# Patient Record
Sex: FEMALE | Race: BLACK OR AFRICAN AMERICAN | NOT HISPANIC OR LATINO | URBAN - METROPOLITAN AREA
[De-identification: names, ages, dates, MRNs, and addresses within clinical notes are randomized per-mention and may not be internally consistent; named-entity substitution may affect disease eponyms.]

---

## 2020-11-01 ENCOUNTER — EMERGENCY (EMERGENCY)
Facility: HOSPITAL | Age: 75
LOS: 1 days | Discharge: HOME | End: 2020-11-01
Attending: EMERGENCY MEDICINE | Admitting: EMERGENCY MEDICINE
Payer: MEDICARE

## 2020-11-01 VITALS
DIASTOLIC BLOOD PRESSURE: 78 MMHG | TEMPERATURE: 98 F | RESPIRATION RATE: 18 BRPM | SYSTOLIC BLOOD PRESSURE: 178 MMHG | OXYGEN SATURATION: 100 % | HEART RATE: 69 BPM

## 2020-11-01 VITALS
RESPIRATION RATE: 18 BRPM | DIASTOLIC BLOOD PRESSURE: 85 MMHG | HEART RATE: 68 BPM | SYSTOLIC BLOOD PRESSURE: 164 MMHG | OXYGEN SATURATION: 100 % | TEMPERATURE: 97 F

## 2020-11-01 PROCEDURE — 70486 CT MAXILLOFACIAL W/O DYE: CPT | Mod: 26

## 2020-11-01 PROCEDURE — 99284 EMERGENCY DEPT VISIT MOD MDM: CPT | Mod: GC

## 2020-11-01 PROCEDURE — 72170 X-RAY EXAM OF PELVIS: CPT | Mod: 26

## 2020-11-01 PROCEDURE — 70450 CT HEAD/BRAIN W/O DYE: CPT | Mod: 26

## 2020-11-01 NOTE — ED ADULT TRIAGE NOTE - CHIEF COMPLAINT QUOTE
pt with hx of alzheimers dementia, brought in by spouse, s/p mechanical fall last night attempting to get into the shower. +head injury. c/o right eye pain , swelling and redness. denies loc. pt is A+Ox0. no AC use.  hx- dementia, htn

## 2020-11-01 NOTE — ED PROVIDER NOTE - CLINICAL SUMMARY MEDICAL DECISION MAKING FREE TEXT BOX
74F s/p blow to face, no LOC, no neck pain, +eye redness and swelling, CT head, will do fluoroscein and eye exam, no preceding lightheadedness/dizziness, no c/o weakness/numbness,. no use of blood thinners.

## 2020-11-01 NOTE — ED ADULT NURSE NOTE - OBJECTIVE STATEMENT
mechanical fall last night during shower while  was helping patient. Patient with right eye injury with notable blood in eye and periorbital swelling,  denies LOC, otherwise skin intact. Patient is AOX0 at baseline r/t dementia. Patient does not verbalize needs,  at bedside provides care for patient. Patient is ambulatory with unsteady gait at baseline. As per  at bedside, patient is acting the same as baseline.

## 2020-11-01 NOTE — ED PROVIDER NOTE - OBJECTIVE STATEMENT
73 y/o F with alzheimer's dementia (pt history obtained by  present at bedside) and HTN presents s/p mechanical fall last night witnessed by . He states she tripped on the ledge of the shower and was unable to catch her . She struck the right side of her head and eye. No LOC. Pt's  states patients mental status is at baseline with no difficulty walking or sleepiness. Remainder of hx is limited.

## 2020-11-01 NOTE — ED PROVIDER NOTE - ATTENDING CONTRIBUTION TO CARE
Seen and examined, hx of dementia, baseline mental status per , accompanied by  to ED, pt. was being assisted into tub when she stepped on her own inside, slipped and fell forward, +blow to R face/eye, no LOC, no N/V, was able to stand and ambulate, no other c/o of pain, no neck c/o, no weakness/numbness, denies visual changes but unreliable historian. WOOD, EOMI, +chemosis to R lower lid, normal sclera, neck NT, full ROM, clear lungs, heart reg, abd soft, NT to palp, no edema, NT calves.

## 2020-11-01 NOTE — ED PROVIDER NOTE - NSFOLLOWUPINSTRUCTIONS_ED_ALL_ED_FT
You were seen in the emergency department for a fall. Your CT scans and x-ray were negative for bleeding or fractures. A copy of the results is attached.     Rest, drink plenty of fluids.  Advance activity as tolerated.  Continue all previously prescribed medications as directed.  Follow up with your primary care physician in 48-72 hours- bring copies of your results.  Return to the ER for worsening or persistent symptoms, and/or ANY NEW OR CONCERNING SYMPTOMS. If you have issues obtaining follow up, please call: [phone] to obtain a doctor or specialist who takes your insurance in your area.

## 2020-11-01 NOTE — ED PROVIDER NOTE - PHYSICAL EXAMINATION
Bruising inferior R eye, mild tenderness. Lateral conjunctival hemorrhage R eye, no corneal abrasion. no sidel sign

## 2020-11-01 NOTE — ED PROVIDER NOTE - PATIENT PORTAL LINK FT
You can access the FollowMyHealth Patient Portal offered by [facility] by registering at the following website: [email or web address]. By joining [employer]’s FollowMyHealth portal, you will also be able to view your health information using other applications (apps) compatible with our system.

## 2021-01-29 NOTE — ED PROVIDER NOTE - THROAT, MLM
Form from [facility] received with recommendation to switch statin therapy due to interaction between Cardizem and Zocor. Dr Juana Petit would like to switch Zocor 20 mg to Lipitor 20 mg daily. Spoke with Ms Kofi Beano and she verbalized understanding. Lipitor sent to [facility]. uvula midline, no vesicles, no redness, and no oropharyngeal exudate.

## 2023-02-16 ENCOUNTER — INPATIENT (INPATIENT)
Facility: HOSPITAL | Age: 78
LOS: 6 days | Discharge: HOME HEALTH | End: 2023-02-23
Attending: HOSPITALIST | Admitting: HOSPITALIST
Payer: MEDICARE

## 2023-02-16 VITALS
RESPIRATION RATE: 16 BRPM | DIASTOLIC BLOOD PRESSURE: 63 MMHG | TEMPERATURE: 100 F | SYSTOLIC BLOOD PRESSURE: 156 MMHG | OXYGEN SATURATION: 100 % | HEART RATE: 69 BPM

## 2023-02-16 LAB
ALBUMIN SERPL ELPH-MCNC: 4.4 G/DL — SIGNIFICANT CHANGE UP (ref 3.3–5)
ALP SERPL-CCNC: 111 U/L — SIGNIFICANT CHANGE UP (ref 40–120)
ALT FLD-CCNC: 20 U/L — SIGNIFICANT CHANGE UP (ref 4–33)
ANION GAP SERPL CALC-SCNC: 12 MMOL/L — SIGNIFICANT CHANGE UP (ref 7–14)
APTT BLD: 29.3 SEC — SIGNIFICANT CHANGE UP (ref 27–36.3)
AST SERPL-CCNC: 26 U/L — SIGNIFICANT CHANGE UP (ref 4–32)
BASE EXCESS BLDV CALC-SCNC: -1.5 MMOL/L — SIGNIFICANT CHANGE UP (ref -2–3)
BASOPHILS # BLD AUTO: 0.02 K/UL — SIGNIFICANT CHANGE UP (ref 0–0.2)
BASOPHILS NFR BLD AUTO: 0.3 % — SIGNIFICANT CHANGE UP (ref 0–2)
BILIRUB SERPL-MCNC: 0.6 MG/DL — SIGNIFICANT CHANGE UP (ref 0.2–1.2)
BLOOD GAS VENOUS COMPREHENSIVE RESULT: SIGNIFICANT CHANGE UP
BUN SERPL-MCNC: 22 MG/DL — SIGNIFICANT CHANGE UP (ref 7–23)
CALCIUM SERPL-MCNC: 9.4 MG/DL — SIGNIFICANT CHANGE UP (ref 8.4–10.5)
CHLORIDE BLDV-SCNC: 107 MMOL/L — SIGNIFICANT CHANGE UP (ref 96–108)
CHLORIDE SERPL-SCNC: 104 MMOL/L — SIGNIFICANT CHANGE UP (ref 98–107)
CO2 BLDV-SCNC: 26.2 MMOL/L — HIGH (ref 22–26)
CO2 SERPL-SCNC: 24 MMOL/L — SIGNIFICANT CHANGE UP (ref 22–31)
CREAT SERPL-MCNC: 1.58 MG/DL — HIGH (ref 0.5–1.3)
EGFR: 34 ML/MIN/1.73M2 — LOW
EOSINOPHIL # BLD AUTO: 0.01 K/UL — SIGNIFICANT CHANGE UP (ref 0–0.5)
EOSINOPHIL NFR BLD AUTO: 0.1 % — SIGNIFICANT CHANGE UP (ref 0–6)
GAS PNL BLDV: 137 MMOL/L — SIGNIFICANT CHANGE UP (ref 136–145)
GLUCOSE BLDV-MCNC: 106 MG/DL — HIGH (ref 70–99)
GLUCOSE SERPL-MCNC: 108 MG/DL — HIGH (ref 70–99)
HCO3 BLDV-SCNC: 25 MMOL/L — SIGNIFICANT CHANGE UP (ref 22–29)
HCT VFR BLD CALC: 42.1 % — SIGNIFICANT CHANGE UP (ref 34.5–45)
HCT VFR BLDA CALC: 41 % — SIGNIFICANT CHANGE UP (ref 34.5–46.5)
HGB BLD CALC-MCNC: 13.8 G/DL — SIGNIFICANT CHANGE UP (ref 11.7–16.1)
HGB BLD-MCNC: 13.6 G/DL — SIGNIFICANT CHANGE UP (ref 11.5–15.5)
IANC: 4.86 K/UL — SIGNIFICANT CHANGE UP (ref 1.8–7.4)
IMM GRANULOCYTES NFR BLD AUTO: 0.3 % — SIGNIFICANT CHANGE UP (ref 0–0.9)
INR BLD: 1.13 RATIO — SIGNIFICANT CHANGE UP (ref 0.88–1.16)
LACTATE BLDV-MCNC: 1.5 MMOL/L — SIGNIFICANT CHANGE UP (ref 0.5–2)
LIDOCAIN IGE QN: 66 U/L — HIGH (ref 7–60)
LYMPHOCYTES # BLD AUTO: 1.25 K/UL — SIGNIFICANT CHANGE UP (ref 1–3.3)
LYMPHOCYTES # BLD AUTO: 17.4 % — SIGNIFICANT CHANGE UP (ref 13–44)
MAGNESIUM SERPL-MCNC: 2.1 MG/DL — SIGNIFICANT CHANGE UP (ref 1.6–2.6)
MCHC RBC-ENTMCNC: 30 PG — SIGNIFICANT CHANGE UP (ref 27–34)
MCHC RBC-ENTMCNC: 32.3 GM/DL — SIGNIFICANT CHANGE UP (ref 32–36)
MCV RBC AUTO: 92.7 FL — SIGNIFICANT CHANGE UP (ref 80–100)
MONOCYTES # BLD AUTO: 1.04 K/UL — HIGH (ref 0–0.9)
MONOCYTES NFR BLD AUTO: 14.4 % — HIGH (ref 2–14)
NEUTROPHILS # BLD AUTO: 4.86 K/UL — SIGNIFICANT CHANGE UP (ref 1.8–7.4)
NEUTROPHILS NFR BLD AUTO: 67.5 % — SIGNIFICANT CHANGE UP (ref 43–77)
NRBC # BLD: 0 /100 WBCS — SIGNIFICANT CHANGE UP (ref 0–0)
NRBC # FLD: 0 K/UL — SIGNIFICANT CHANGE UP (ref 0–0)
NT-PROBNP SERPL-SCNC: 238 PG/ML — SIGNIFICANT CHANGE UP
PCO2 BLDV: 47 MMHG — SIGNIFICANT CHANGE UP (ref 39–52)
PH BLDV: 7.33 — SIGNIFICANT CHANGE UP (ref 7.32–7.43)
PHOSPHATE SERPL-MCNC: 2.8 MG/DL — SIGNIFICANT CHANGE UP (ref 2.5–4.5)
PLATELET # BLD AUTO: 149 K/UL — LOW (ref 150–400)
PO2 BLDV: 28 MMHG — SIGNIFICANT CHANGE UP (ref 25–45)
POTASSIUM BLDV-SCNC: 3.8 MMOL/L — SIGNIFICANT CHANGE UP (ref 3.5–5.1)
POTASSIUM SERPL-MCNC: 3.8 MMOL/L — SIGNIFICANT CHANGE UP (ref 3.5–5.3)
POTASSIUM SERPL-SCNC: 3.8 MMOL/L — SIGNIFICANT CHANGE UP (ref 3.5–5.3)
PROT SERPL-MCNC: 7.6 G/DL — SIGNIFICANT CHANGE UP (ref 6–8.3)
PROTHROM AB SERPL-ACNC: 13.1 SEC — SIGNIFICANT CHANGE UP (ref 10.5–13.4)
RBC # BLD: 4.54 M/UL — SIGNIFICANT CHANGE UP (ref 3.8–5.2)
RBC # FLD: 14 % — SIGNIFICANT CHANGE UP (ref 10.3–14.5)
SAO2 % BLDV: 50.6 % — LOW (ref 67–88)
SARS-COV-2 RNA SPEC QL NAA+PROBE: DETECTED
SODIUM SERPL-SCNC: 140 MMOL/L — SIGNIFICANT CHANGE UP (ref 135–145)
TROPONIN T, HIGH SENSITIVITY RESULT: 16 NG/L — SIGNIFICANT CHANGE UP
WBC # BLD: 7.2 K/UL — SIGNIFICANT CHANGE UP (ref 3.8–10.5)
WBC # FLD AUTO: 7.2 K/UL — SIGNIFICANT CHANGE UP (ref 3.8–10.5)

## 2023-02-16 PROCEDURE — 99285 EMERGENCY DEPT VISIT HI MDM: CPT

## 2023-02-16 PROCEDURE — 71045 X-RAY EXAM CHEST 1 VIEW: CPT | Mod: 26

## 2023-02-16 RX ORDER — ACETAMINOPHEN 500 MG
1000 TABLET ORAL ONCE
Refills: 0 | Status: COMPLETED | OUTPATIENT
Start: 2023-02-16 | End: 2023-02-16

## 2023-02-16 RX ADMIN — Medication 400 MILLIGRAM(S): at 22:59

## 2023-02-16 NOTE — ED PROVIDER NOTE - PHYSICAL EXAMINATION
General: WN/WD NAD elderly woman lying in bed in NAD  Head: Atraumatic  Eyes: EOM grossly in tact, no scleral icterus  ENT: dry mucous membranes  Neurology: A&Ox0, nonfocal, WILDER x 4  Respiratory: normal respiratory effort, CTAB b/l, +cough  CV: Extremities warm and well perfused, RRR w/S1S2, no m/r/g  Abdominal: Soft, non-distended, nontender  Extremities: No edema  Skin: No rashes

## 2023-02-16 NOTE — ED PROVIDER NOTE - CLINICAL SUMMARY MEDICAL DECISION MAKING FREE TEXT BOX
Attending MD Sam.  Agree with above.  Pt is a 78 yo fem with pmhx of alzheimer's dementia and HTN who presents to ED with complaint of escalating lethargy.  Pt has severe dementia which leaves her non-verbal at baseline.   endorses decreased alertness over the last couple of days and noted that the pt slept all day yesterday and began having shaking today c/w likely rigors.  Family has also noted that pt has had rhinorrhea.   denies difficulty caring for her at home but brought her to ED today because of concern for illness.  Planned screening for probable viral syndrome and reassessment.   states that these sxs have not reduced their ability to provide home care and if there is no emergent need for admission  endorses comfort with taking pt home. Attending MD Sam.  Agree with above.  Pt is a 78 yo fem with pmhx of alzheimer's dementia and HTN who presents to ED with complaint of escalating lethargy.  Pt has severe dementia which leaves her non-verbal at baseline.   endorses decreased alertness over the last couple of days and noted that the pt slept all day yesterday and began having shaking today c/w likely rigors.  Family has also noted that pt has had rhinorrhea.   denies difficulty caring for her at home but brought her to ED today because of concern for illness.  Planned screening for probable viral syndrome and reassessment.   states that these sxs have not reduced their ability to provide home care and if there is no emergent need for admission  endorses comfort with taking pt home.  Planned labs, CXR, RVP.  Pt hemodynamically stable at time of signout to incoming team pending admission.

## 2023-02-16 NOTE — ED ADULT TRIAGE NOTE - CHIEF COMPLAINT QUOTE
Pt brought to ED by family c/o increased lethargy since Tuesday. As per family, Pt slept all day yesterday, started shaking 8:30 AM yesterday morning. Family also states Pt has been congested. Pt nonverbal, unable to follow commands at baseline due to Alzheimers.

## 2023-02-16 NOTE — ED PROVIDER NOTE - OBJECTIVE STATEMENT
Pt is a 77yoF w/Hx of alzheimer's dementia (noncommunicative, pt history obtained by  present at bedside), HTN, HLD p/w lethargy. Since Tuesday, patient has had worsening lethargy associated with chills/rigors, rhinorrhea, dry cough.  Has been noticed patient has been sleeping throughout the day, sleeping while eating.  Limited ROS; no associated fevers, vomiting, change in urine color/odor, hematuria/hematochezia, falls, focal weakness.    PMD: Caleb Bernard Pt is a 77yoF w/Hx of alzheimer's dementia (noncommunicative, pt history obtained by  present at bedside), HTN, HLD p/w lethargy. Since Tuesday, patient has had worsening lethargy associated with chills/rigors, rhinorrhea, dry cough.  Has been noticed patient has been sleeping throughout the day, sleeping while eating.  Limited ROS; no associated fevers, vomiting, change in urine color/odor, hematuria/hematochezia, falls, focal weakness.    PMD: Caleb Darden

## 2023-02-16 NOTE — ED PROVIDER NOTE - CARE PLAN
1 Principal Discharge DX:	Lethargy   Principal Discharge DX:	COVID-19  Secondary Diagnosis:	Lethargy  Secondary Diagnosis:	SAMIA (acute kidney injury)

## 2023-02-16 NOTE — ED PROVIDER NOTE - ATTENDING CONTRIBUTION TO CARE
Attending MD Sam:  I performed a history and physical exam of the patient and discussed their management with the resident. I reviewed the resident's note and agree with the documented findings and plan of care. My medical decision making and observations are found above.

## 2023-02-16 NOTE — ED ADULT NURSE NOTE - OBJECTIVE STATEMENT
Yaakov RN: Pt received in rm #14, 77Y F, nonverbal at baseline, as per  at bedside pt ambulatory with assistance. PMHX Alzheimer's. As per  pt with generalized weakness, cough and decreased PO intake. Pt rectally 100.5F on exam, VS as charted, respirations even and unlabored, IV placed 20G Rt bicep, labs sent, covid swab sent. Pt bedding changed. Pt with  at bedside, instructed to call for assistance as needed, report to primary RN.

## 2023-02-17 DIAGNOSIS — I10 ESSENTIAL (PRIMARY) HYPERTENSION: ICD-10-CM

## 2023-02-17 DIAGNOSIS — U07.1 COVID-19: ICD-10-CM

## 2023-02-17 DIAGNOSIS — R79.89 OTHER SPECIFIED ABNORMAL FINDINGS OF BLOOD CHEMISTRY: ICD-10-CM

## 2023-02-17 DIAGNOSIS — Z29.9 ENCOUNTER FOR PROPHYLACTIC MEASURES, UNSPECIFIED: ICD-10-CM

## 2023-02-17 DIAGNOSIS — G30.9 ALZHEIMER'S DISEASE, UNSPECIFIED: ICD-10-CM

## 2023-02-17 DIAGNOSIS — E78.5 HYPERLIPIDEMIA, UNSPECIFIED: ICD-10-CM

## 2023-02-17 LAB
APPEARANCE UR: ABNORMAL
B PERT DNA SPEC QL NAA+PROBE: SIGNIFICANT CHANGE UP
B PERT+PARAPERT DNA PNL SPEC NAA+PROBE: SIGNIFICANT CHANGE UP
BACTERIA # UR AUTO: ABNORMAL
BILIRUB UR-MCNC: NEGATIVE — SIGNIFICANT CHANGE UP
BORDETELLA PARAPERTUSSIS (RAPRVP): SIGNIFICANT CHANGE UP
C PNEUM DNA SPEC QL NAA+PROBE: SIGNIFICANT CHANGE UP
COLOR SPEC: YELLOW — SIGNIFICANT CHANGE UP
DIFF PNL FLD: ABNORMAL
EPI CELLS # UR: SIGNIFICANT CHANGE UP
FLUAV SUBTYP SPEC NAA+PROBE: SIGNIFICANT CHANGE UP
FLUBV RNA SPEC QL NAA+PROBE: SIGNIFICANT CHANGE UP
GLUCOSE UR QL: NEGATIVE — SIGNIFICANT CHANGE UP
HADV DNA SPEC QL NAA+PROBE: SIGNIFICANT CHANGE UP
HCOV 229E RNA SPEC QL NAA+PROBE: SIGNIFICANT CHANGE UP
HCOV HKU1 RNA SPEC QL NAA+PROBE: SIGNIFICANT CHANGE UP
HCOV NL63 RNA SPEC QL NAA+PROBE: SIGNIFICANT CHANGE UP
HCOV OC43 RNA SPEC QL NAA+PROBE: SIGNIFICANT CHANGE UP
HMPV RNA SPEC QL NAA+PROBE: SIGNIFICANT CHANGE UP
HPIV1 RNA SPEC QL NAA+PROBE: SIGNIFICANT CHANGE UP
HPIV2 RNA SPEC QL NAA+PROBE: SIGNIFICANT CHANGE UP
HPIV3 RNA SPEC QL NAA+PROBE: SIGNIFICANT CHANGE UP
HPIV4 RNA SPEC QL NAA+PROBE: SIGNIFICANT CHANGE UP
HYALINE CASTS # UR AUTO: SIGNIFICANT CHANGE UP /LPF (ref 0–7)
KETONES UR-MCNC: ABNORMAL
LEUKOCYTE ESTERASE UR-ACNC: NEGATIVE — SIGNIFICANT CHANGE UP
M PNEUMO DNA SPEC QL NAA+PROBE: SIGNIFICANT CHANGE UP
NITRITE UR-MCNC: NEGATIVE — SIGNIFICANT CHANGE UP
PH UR: 6 — SIGNIFICANT CHANGE UP (ref 5–8)
PROT UR-MCNC: ABNORMAL
RAPID RVP RESULT: DETECTED
RBC CASTS # UR COMP ASSIST: SIGNIFICANT CHANGE UP /HPF (ref 0–4)
RSV RNA SPEC QL NAA+PROBE: SIGNIFICANT CHANGE UP
RV+EV RNA SPEC QL NAA+PROBE: SIGNIFICANT CHANGE UP
SP GR SPEC: 1.03 — SIGNIFICANT CHANGE UP (ref 1.01–1.05)
UROBILINOGEN FLD QL: ABNORMAL
WBC UR QL: SIGNIFICANT CHANGE UP /HPF (ref 0–5)

## 2023-02-17 PROCEDURE — 99223 1ST HOSP IP/OBS HIGH 75: CPT

## 2023-02-17 PROCEDURE — 70450 CT HEAD/BRAIN W/O DYE: CPT | Mod: 26,MB

## 2023-02-17 PROCEDURE — 71250 CT THORAX DX C-: CPT | Mod: 26,MB

## 2023-02-17 RX ORDER — SIMVASTATIN 20 MG/1
5 TABLET, FILM COATED ORAL AT BEDTIME
Refills: 0 | Status: DISCONTINUED | OUTPATIENT
Start: 2023-02-17 | End: 2023-02-17

## 2023-02-17 RX ORDER — REMDESIVIR 5 MG/ML
200 INJECTION INTRAVENOUS EVERY 24 HOURS
Refills: 0 | Status: COMPLETED | OUTPATIENT
Start: 2023-02-17 | End: 2023-02-18

## 2023-02-17 RX ORDER — ACETAMINOPHEN 500 MG
650 TABLET ORAL EVERY 6 HOURS
Refills: 0 | Status: DISCONTINUED | OUTPATIENT
Start: 2023-02-17 | End: 2023-02-23

## 2023-02-17 RX ORDER — SODIUM CHLORIDE 9 MG/ML
500 INJECTION INTRAMUSCULAR; INTRAVENOUS; SUBCUTANEOUS ONCE
Refills: 0 | Status: COMPLETED | OUTPATIENT
Start: 2023-02-17 | End: 2023-02-17

## 2023-02-17 RX ORDER — SIMVASTATIN 20 MG/1
5 TABLET, FILM COATED ORAL AT BEDTIME
Refills: 0 | Status: DISCONTINUED | OUTPATIENT
Start: 2023-02-17 | End: 2023-02-23

## 2023-02-17 RX ORDER — DONEPEZIL HYDROCHLORIDE 10 MG/1
10 TABLET, FILM COATED ORAL AT BEDTIME
Refills: 0 | Status: DISCONTINUED | OUTPATIENT
Start: 2023-02-17 | End: 2023-02-23

## 2023-02-17 RX ORDER — MEMANTINE HYDROCHLORIDE 10 MG/1
10 TABLET ORAL
Refills: 0 | Status: DISCONTINUED | OUTPATIENT
Start: 2023-02-17 | End: 2023-02-23

## 2023-02-17 RX ORDER — ENOXAPARIN SODIUM 100 MG/ML
40 INJECTION SUBCUTANEOUS EVERY 12 HOURS
Refills: 0 | Status: DISCONTINUED | OUTPATIENT
Start: 2023-02-17 | End: 2023-02-23

## 2023-02-17 RX ORDER — DILTIAZEM HCL 120 MG
60 CAPSULE, EXT RELEASE 24 HR ORAL
Refills: 0 | Status: DISCONTINUED | OUTPATIENT
Start: 2023-02-17 | End: 2023-02-23

## 2023-02-17 RX ORDER — REMDESIVIR 5 MG/ML
INJECTION INTRAVENOUS
Refills: 0 | Status: COMPLETED | OUTPATIENT
Start: 2023-02-17 | End: 2023-02-20

## 2023-02-17 RX ORDER — MEMANTINE HYDROCHLORIDE 10 MG/1
10 TABLET ORAL DAILY
Refills: 0 | Status: DISCONTINUED | OUTPATIENT
Start: 2023-02-17 | End: 2023-02-17

## 2023-02-17 RX ORDER — DONEPEZIL HYDROCHLORIDE 10 MG/1
10 TABLET, FILM COATED ORAL AT BEDTIME
Refills: 0 | Status: DISCONTINUED | OUTPATIENT
Start: 2023-02-17 | End: 2023-02-17

## 2023-02-17 RX ORDER — DILTIAZEM HCL 120 MG
60 CAPSULE, EXT RELEASE 24 HR ORAL DAILY
Refills: 0 | Status: DISCONTINUED | OUTPATIENT
Start: 2023-02-17 | End: 2023-02-17

## 2023-02-17 RX ADMIN — MEMANTINE HYDROCHLORIDE 10 MILLIGRAM(S): 10 TABLET ORAL at 17:37

## 2023-02-17 RX ADMIN — Medication 60 MILLIGRAM(S): at 17:37

## 2023-02-17 RX ADMIN — SODIUM CHLORIDE 500 MILLILITER(S): 9 INJECTION INTRAMUSCULAR; INTRAVENOUS; SUBCUTANEOUS at 03:06

## 2023-02-17 NOTE — PATIENT PROFILE ADULT - NSPROGENSOURCEINFO_GEN_A_NUR
Patient sleeping, lethargic, unable to obtain information at this time. Patient sleeping, lethargic, unable to obtain information at this time./family

## 2023-02-17 NOTE — H&P ADULT - PROBLEM SELECTOR PLAN 2
Resume home Namenda and Donepezil  Family states she eats regular diet with no choking Spoke with PMD Dr. Darden who was not able to obtain baseline labs at time of conversation  Pt s/p 500 cc NS bolus  Check repeat BMP in AM

## 2023-02-17 NOTE — H&P ADULT - PROBLEM SELECTOR PLAN 3
Resume home Cardizem Resume home Namenda and Donepezil  Family states she eats regular diet with no choking

## 2023-02-17 NOTE — H&P ADULT - ASSESSMENT
77F dementia (nonverbal at baseline), requires full assist with ADLs, HTN, HLD, presents with several days of lethargy and cough in setting of acute COVID infection

## 2023-02-17 NOTE — H&P ADULT - PROBLEM SELECTOR PLAN 1
Pt fully vaccinated and boosted, not hypoxic, not tachycardic  - No indication for steroids at this time  - Paxlovid cannot be crushed per pharmacy  - Given high risk features, will start Remdesivir  - Add on D-Dimer to bloodwork to determine need for prolonged DVT ppx   - If clinically worsens can trend inflammatory markers (Ferritin, LDH, CRP)

## 2023-02-17 NOTE — H&P ADULT - NSHPSOCIALHISTORY_GEN_ALL_CORE
Lives at home with ; son and grauddaughter live close by and check in frequently.  No ETOH/tobacco use.

## 2023-02-17 NOTE — PATIENT PROFILE ADULT - FALL HARM RISK - HARM RISK INTERVENTIONS
Assistance with ambulation/Assistance OOB with selected safe patient handling equipment/Communicate Risk of Fall with Harm to all staff/Discuss with provider need for PT consult/Monitor gait and stability/Reinforce activity limits and safety measures with patient and family/Tailored Fall Risk Interventions/Visual Cue: Yellow wristband and red socks/Bed in lowest position, wheels locked, appropriate side rails in place/Call bell, personal items and telephone in reach/Instruct patient to call for assistance before getting out of bed or chair/Non-slip footwear when patient is out of bed/[place] to call system/Physically safe environment - no spills, clutter or unnecessary equipment/Purposeful Proactive Rounding/Room/bathroom lighting operational, light cord in reach

## 2023-02-17 NOTE — H&P ADULT - NSHPPHYSICALEXAM_GEN_ALL_CORE
GENERAL: NAD, well-developed, nonverbal   HEAD:  Atraumatic, Normocephalic  EYES: EOMI, PERRLA, conjunctiva and sclera clear  CHEST/LUNG: Clear to auscultation bilaterally; No wheeze  HEART: Regular rate and rhythm; No murmurs, rubs, or gallops  ABDOMEN: Soft, Nontender, Nondistended; Bowel sounds present  EXTREMITIES:  2+ Peripheral Pulses, No clubbing, cyanosis, or edema  NEUROLOGY: non-focal, spontaneously moving all 4 extremities, noncommunicative   SKIN: No rashes or lesions

## 2023-02-17 NOTE — H&P ADULT - PROBLEM SELECTOR PLAN 6
DVT ppx with Lovenox 40mg BID for now (CrCl >15), monitor Cr daily and switch to HSQ of CrCl worsens   Care discussed with family at bedside

## 2023-02-17 NOTE — H&P ADULT - NSHPLABSRESULTS_GEN_ALL_CORE
LABS:                        13.6   7.20  )-----------( 149      ( 2023 22:02 )             42.1     02-16    140  |  104  |  22  ----------------------------<  108<H>  3.8   |  24  |  1.58<H>    Ca    9.4      2023 22:02  Phos  2.8     02-16  Mg     2.10     -16    TPro  7.6  /  Alb  4.4  /  TBili  0.6  /  DBili  x   /  AST  26  /  ALT  20  /  AlkPhos  111  02-16    PT/INR - ( 2023 22:02 )   PT: 13.1 sec;   INR: 1.13 ratio         PTT - ( 2023 22:02 )  PTT:29.3 sec      Urinalysis Basic - ( 2023 04:22 )    Color: Yellow / Appearance: Slightly Turbid / S.030 / pH: x  Gluc: x / Ketone: Trace  / Bili: Negative / Urobili: 3 mg/dL   Blood: x / Protein: 30 mg/dL / Nitrite: Negative   Leuk Esterase: Negative / RBC: 0-5 /HPF / WBC 0-5 /HPF   Sq Epi: x / Non Sq Epi: Occasional / Bacteria: Occasional    SARS-CoV-2: Detected (2023 22:44)    RADIOLOGY & ADDITIONAL TESTS:  New Imaging Personally Reviewed Today: Clear lungs, no consolidation or effusions  New Electrocardiogram Personally Reviewed Today:    COMMUNICATION:  Care Discussed with Consultants/Other Providers and Details of Discussion:  Discussions with Patient/Family:  PCP Communication: LABS:                        13.6   7.20  )-----------( 149      ( 2023 22:02 )             42.1     02-16    140  |  104  |  22  ----------------------------<  108<H>  3.8   |  24  |  1.58<H>    Ca    9.4      2023 22:02  Phos  2.8     02-16  Mg     2.10     02-16    TPro  7.6  /  Alb  4.4  /  TBili  0.6  /  DBili  x   /  AST  26  /  ALT  20  /  AlkPhos  111  02-16    PT/INR - ( 2023 22:02 )   PT: 13.1 sec;   INR: 1.13 ratio         PTT - ( 2023 22:02 )  PTT:29.3 sec      Urinalysis Basic - ( 2023 04:22 )    Color: Yellow / Appearance: Slightly Turbid / S.030 / pH: x  Gluc: x / Ketone: Trace  / Bili: Negative / Urobili: 3 mg/dL   Blood: x / Protein: 30 mg/dL / Nitrite: Negative   Leuk Esterase: Negative / RBC: 0-5 /HPF / WBC 0-5 /HPF   Sq Epi: x / Non Sq Epi: Occasional / Bacteria: Occasional    SARS-CoV-2: Detected (2023 22:44)    RADIOLOGY & ADDITIONAL TESTS:  New Imaging Personally Reviewed Today: Clear lungs, no consolidation or effusions    < from: CT Chest No Cont (23 @ 00:53) >    IMPRESSION:  Left lower lobe dependent groundglass opacity may be secondary to   atelectasis. Pneumonia or aspiration not excluded. Clinical correlation   necessary  5 mm hypodense nodule in the right thyroid lobe. Elective thyroid   ultrasound canbe performed for further evaluation.    Partially visualized dilated left renal pelvis that could suggest   hydronephrosis versus parapelvic cyst. Additional imaging is recommended   for further evaluation.    < end of copied text >      New Electrocardiogram Personally Reviewed Today: NSR, TWI V3-V6,    COMMUNICATION:  Discussions with Patient/Family: , son and granddaughter  PCP Communication: DR. Darden

## 2023-02-17 NOTE — ED ADULT NURSE REASSESSMENT NOTE - NS ED NURSE REASSESS COMMENT FT1
Break coverage RN: Pt is maintaining normal mental status, breathing spontaneously and nonlabored, no acute distress in appearance. Pt offers no complaints at this time. Pt medicated as per orders via patent 20G IV in L AC. Safety precautions in place and to be maintained. Will continue to monitor.
Pt at baseline mental status, awake and alert but remains nonverbal. Pt in NAD, no nonverbal indicators of pain present. RR even and unlabored. Pt stable upon exiting room, safety measures in place
Pt straight catheterized using sterile technique 14 Russian catheter, approx 400ml of bright yellow urine drained. UA/UC sent as per orders. Safety measures in place, stretcher locked and in lowest position. Pt stable upon exiting the room
Received report from night shift RN. Pt resting comfortably in bed, denies complaints. Pt A&Ox0 at baseline, pt cleaned of incontinence, turned and repositioned for comfort. Will continue to monitor.
Pt resting comfortably in bed, in no apparent distress. Pt turned and repositioned for comfort, pt pending admission bed assignment. Will continue to monitor.
Pt resting comfortably in bed, pt cleaned of incontinence, turned and repositioned for comfort. Will continue to monitor.

## 2023-02-17 NOTE — H&P ADULT - HISTORY OF PRESENT ILLNESS
77F dementia (nonverbal at baseline), HTN, HLD, presents with several days of lethargy.  77F dementia (nonverbal at baseline), requires full assist with ADLs, HTN, HLD, presents with several days of lethargy.  Collateral was obtained from , son and granddaughter at bedside.  They noticed she was very fatigued and difficult to get out of bed, which at baseline she is usually able to join the family for meals while ambulating with assistance. They note subjective fevers but did not take her temperature. She developed a productive cough since Wednesday morning.  They are unaware of known COVID exposures and pt is largely home bound.  She is fully vaccinated and boosted against COVID-19 and this is her first COVID infection.

## 2023-02-18 LAB
ALBUMIN SERPL ELPH-MCNC: 4.1 G/DL — SIGNIFICANT CHANGE UP (ref 3.3–5)
ALP SERPL-CCNC: 105 U/L — SIGNIFICANT CHANGE UP (ref 40–120)
ALT FLD-CCNC: 22 U/L — SIGNIFICANT CHANGE UP (ref 4–33)
ANION GAP SERPL CALC-SCNC: 13 MMOL/L — SIGNIFICANT CHANGE UP (ref 7–14)
AST SERPL-CCNC: 26 U/L — SIGNIFICANT CHANGE UP (ref 4–32)
BASOPHILS # BLD AUTO: 0.01 K/UL — SIGNIFICANT CHANGE UP (ref 0–0.2)
BASOPHILS NFR BLD AUTO: 0.2 % — SIGNIFICANT CHANGE UP (ref 0–2)
BILIRUB SERPL-MCNC: 0.5 MG/DL — SIGNIFICANT CHANGE UP (ref 0.2–1.2)
BUN SERPL-MCNC: 15 MG/DL — SIGNIFICANT CHANGE UP (ref 7–23)
CALCIUM SERPL-MCNC: 9.5 MG/DL — SIGNIFICANT CHANGE UP (ref 8.4–10.5)
CHLORIDE SERPL-SCNC: 105 MMOL/L — SIGNIFICANT CHANGE UP (ref 98–107)
CO2 SERPL-SCNC: 23 MMOL/L — SIGNIFICANT CHANGE UP (ref 22–31)
CREAT SERPL-MCNC: 0.93 MG/DL — SIGNIFICANT CHANGE UP (ref 0.5–1.3)
CULTURE RESULTS: SIGNIFICANT CHANGE UP
EGFR: 64 ML/MIN/1.73M2 — SIGNIFICANT CHANGE UP
EOSINOPHIL # BLD AUTO: 0.03 K/UL — SIGNIFICANT CHANGE UP (ref 0–0.5)
EOSINOPHIL NFR BLD AUTO: 0.7 % — SIGNIFICANT CHANGE UP (ref 0–6)
GLUCOSE SERPL-MCNC: 94 MG/DL — SIGNIFICANT CHANGE UP (ref 70–99)
HCT VFR BLD CALC: 41.4 % — SIGNIFICANT CHANGE UP (ref 34.5–45)
HCV AB S/CO SERPL IA: 0.06 S/CO — SIGNIFICANT CHANGE UP (ref 0–0.99)
HCV AB SERPL-IMP: SIGNIFICANT CHANGE UP
HGB BLD-MCNC: 13.6 G/DL — SIGNIFICANT CHANGE UP (ref 11.5–15.5)
IANC: 2.56 K/UL — SIGNIFICANT CHANGE UP (ref 1.8–7.4)
IMM GRANULOCYTES NFR BLD AUTO: 0.2 % — SIGNIFICANT CHANGE UP (ref 0–0.9)
LYMPHOCYTES # BLD AUTO: 1.34 K/UL — SIGNIFICANT CHANGE UP (ref 1–3.3)
LYMPHOCYTES # BLD AUTO: 29.4 % — SIGNIFICANT CHANGE UP (ref 13–44)
MAGNESIUM SERPL-MCNC: 2.1 MG/DL — SIGNIFICANT CHANGE UP (ref 1.6–2.6)
MCHC RBC-ENTMCNC: 30.2 PG — SIGNIFICANT CHANGE UP (ref 27–34)
MCHC RBC-ENTMCNC: 32.9 GM/DL — SIGNIFICANT CHANGE UP (ref 32–36)
MCV RBC AUTO: 92 FL — SIGNIFICANT CHANGE UP (ref 80–100)
MONOCYTES # BLD AUTO: 0.61 K/UL — SIGNIFICANT CHANGE UP (ref 0–0.9)
MONOCYTES NFR BLD AUTO: 13.4 % — SIGNIFICANT CHANGE UP (ref 2–14)
NEUTROPHILS # BLD AUTO: 2.56 K/UL — SIGNIFICANT CHANGE UP (ref 1.8–7.4)
NEUTROPHILS NFR BLD AUTO: 56.1 % — SIGNIFICANT CHANGE UP (ref 43–77)
NRBC # BLD: 0 /100 WBCS — SIGNIFICANT CHANGE UP (ref 0–0)
NRBC # FLD: 0 K/UL — SIGNIFICANT CHANGE UP (ref 0–0)
PHOSPHATE SERPL-MCNC: 3.2 MG/DL — SIGNIFICANT CHANGE UP (ref 2.5–4.5)
PLATELET # BLD AUTO: 157 K/UL — SIGNIFICANT CHANGE UP (ref 150–400)
POTASSIUM SERPL-MCNC: 3.6 MMOL/L — SIGNIFICANT CHANGE UP (ref 3.5–5.3)
POTASSIUM SERPL-SCNC: 3.6 MMOL/L — SIGNIFICANT CHANGE UP (ref 3.5–5.3)
PROT SERPL-MCNC: 7.3 G/DL — SIGNIFICANT CHANGE UP (ref 6–8.3)
RBC # BLD: 4.5 M/UL — SIGNIFICANT CHANGE UP (ref 3.8–5.2)
RBC # FLD: 13.4 % — SIGNIFICANT CHANGE UP (ref 10.3–14.5)
SODIUM SERPL-SCNC: 141 MMOL/L — SIGNIFICANT CHANGE UP (ref 135–145)
SPECIMEN SOURCE: SIGNIFICANT CHANGE UP
WBC # BLD: 4.56 K/UL — SIGNIFICANT CHANGE UP (ref 3.8–10.5)
WBC # FLD AUTO: 4.56 K/UL — SIGNIFICANT CHANGE UP (ref 3.8–10.5)

## 2023-02-18 PROCEDURE — 99232 SBSQ HOSP IP/OBS MODERATE 35: CPT

## 2023-02-18 RX ORDER — REMDESIVIR 5 MG/ML
100 INJECTION INTRAVENOUS EVERY 24 HOURS
Refills: 0 | Status: COMPLETED | OUTPATIENT
Start: 2023-02-19 | End: 2023-02-20

## 2023-02-18 RX ADMIN — REMDESIVIR 200 MILLIGRAM(S): 5 INJECTION INTRAVENOUS at 06:10

## 2023-02-18 RX ADMIN — SIMVASTATIN 5 MILLIGRAM(S): 20 TABLET, FILM COATED ORAL at 01:02

## 2023-02-18 RX ADMIN — MEMANTINE HYDROCHLORIDE 10 MILLIGRAM(S): 10 TABLET ORAL at 06:10

## 2023-02-18 RX ADMIN — ENOXAPARIN SODIUM 40 MILLIGRAM(S): 100 INJECTION SUBCUTANEOUS at 06:10

## 2023-02-18 RX ADMIN — DONEPEZIL HYDROCHLORIDE 10 MILLIGRAM(S): 10 TABLET, FILM COATED ORAL at 01:02

## 2023-02-18 RX ADMIN — Medication 60 MILLIGRAM(S): at 01:02

## 2023-02-18 RX ADMIN — ENOXAPARIN SODIUM 40 MILLIGRAM(S): 100 INJECTION SUBCUTANEOUS at 18:21

## 2023-02-18 RX ADMIN — DONEPEZIL HYDROCHLORIDE 10 MILLIGRAM(S): 10 TABLET, FILM COATED ORAL at 22:22

## 2023-02-18 RX ADMIN — Medication 60 MILLIGRAM(S): at 18:21

## 2023-02-18 RX ADMIN — SIMVASTATIN 5 MILLIGRAM(S): 20 TABLET, FILM COATED ORAL at 22:22

## 2023-02-18 RX ADMIN — Medication 60 MILLIGRAM(S): at 11:51

## 2023-02-18 RX ADMIN — MEMANTINE HYDROCHLORIDE 10 MILLIGRAM(S): 10 TABLET ORAL at 18:21

## 2023-02-18 RX ADMIN — Medication 60 MILLIGRAM(S): at 06:10

## 2023-02-18 NOTE — PROGRESS NOTE ADULT - PROBLEM SELECTOR PLAN 2
Spoke with PMD Dr. Darden who was not able to obtain baseline labs at time of conversation  Improved s/p 500 cc NS bolus

## 2023-02-19 LAB
ALBUMIN SERPL ELPH-MCNC: 3.7 G/DL — SIGNIFICANT CHANGE UP (ref 3.3–5)
ALP SERPL-CCNC: 104 U/L — SIGNIFICANT CHANGE UP (ref 40–120)
ALT FLD-CCNC: 24 U/L — SIGNIFICANT CHANGE UP (ref 4–33)
ANION GAP SERPL CALC-SCNC: 11 MMOL/L — SIGNIFICANT CHANGE UP (ref 7–14)
AST SERPL-CCNC: 28 U/L — SIGNIFICANT CHANGE UP (ref 4–32)
BASOPHILS # BLD AUTO: 0.02 K/UL — SIGNIFICANT CHANGE UP (ref 0–0.2)
BASOPHILS NFR BLD AUTO: 0.6 % — SIGNIFICANT CHANGE UP (ref 0–2)
BILIRUB SERPL-MCNC: 0.5 MG/DL — SIGNIFICANT CHANGE UP (ref 0.2–1.2)
BUN SERPL-MCNC: 17 MG/DL — SIGNIFICANT CHANGE UP (ref 7–23)
CALCIUM SERPL-MCNC: 9.5 MG/DL — SIGNIFICANT CHANGE UP (ref 8.4–10.5)
CHLORIDE SERPL-SCNC: 108 MMOL/L — HIGH (ref 98–107)
CO2 SERPL-SCNC: 25 MMOL/L — SIGNIFICANT CHANGE UP (ref 22–31)
CREAT SERPL-MCNC: 1.07 MG/DL — SIGNIFICANT CHANGE UP (ref 0.5–1.3)
D DIMER BLD IA.RAPID-MCNC: 182 NG/ML DDU — SIGNIFICANT CHANGE UP
EGFR: 54 ML/MIN/1.73M2 — LOW
EOSINOPHIL # BLD AUTO: 0.03 K/UL — SIGNIFICANT CHANGE UP (ref 0–0.5)
EOSINOPHIL NFR BLD AUTO: 0.9 % — SIGNIFICANT CHANGE UP (ref 0–6)
GLUCOSE SERPL-MCNC: 90 MG/DL — SIGNIFICANT CHANGE UP (ref 70–99)
HCT VFR BLD CALC: 43.7 % — SIGNIFICANT CHANGE UP (ref 34.5–45)
HGB BLD-MCNC: 14.1 G/DL — SIGNIFICANT CHANGE UP (ref 11.5–15.5)
IANC: 1.48 K/UL — LOW (ref 1.8–7.4)
IMM GRANULOCYTES NFR BLD AUTO: 0.3 % — SIGNIFICANT CHANGE UP (ref 0–0.9)
LYMPHOCYTES # BLD AUTO: 1.37 K/UL — SIGNIFICANT CHANGE UP (ref 1–3.3)
LYMPHOCYTES # BLD AUTO: 39.9 % — SIGNIFICANT CHANGE UP (ref 13–44)
MAGNESIUM SERPL-MCNC: 2.1 MG/DL — SIGNIFICANT CHANGE UP (ref 1.6–2.6)
MCHC RBC-ENTMCNC: 30.1 PG — SIGNIFICANT CHANGE UP (ref 27–34)
MCHC RBC-ENTMCNC: 32.3 GM/DL — SIGNIFICANT CHANGE UP (ref 32–36)
MCV RBC AUTO: 93.4 FL — SIGNIFICANT CHANGE UP (ref 80–100)
MONOCYTES # BLD AUTO: 0.52 K/UL — SIGNIFICANT CHANGE UP (ref 0–0.9)
MONOCYTES NFR BLD AUTO: 15.2 % — HIGH (ref 2–14)
NEUTROPHILS # BLD AUTO: 1.48 K/UL — LOW (ref 1.8–7.4)
NEUTROPHILS NFR BLD AUTO: 43.1 % — SIGNIFICANT CHANGE UP (ref 43–77)
NRBC # BLD: 0 /100 WBCS — SIGNIFICANT CHANGE UP (ref 0–0)
NRBC # FLD: 0 K/UL — SIGNIFICANT CHANGE UP (ref 0–0)
PHOSPHATE SERPL-MCNC: 3.6 MG/DL — SIGNIFICANT CHANGE UP (ref 2.5–4.5)
PLATELET # BLD AUTO: 171 K/UL — SIGNIFICANT CHANGE UP (ref 150–400)
POTASSIUM SERPL-MCNC: 3.9 MMOL/L — SIGNIFICANT CHANGE UP (ref 3.5–5.3)
POTASSIUM SERPL-SCNC: 3.9 MMOL/L — SIGNIFICANT CHANGE UP (ref 3.5–5.3)
PROT SERPL-MCNC: 6.8 G/DL — SIGNIFICANT CHANGE UP (ref 6–8.3)
RBC # BLD: 4.68 M/UL — SIGNIFICANT CHANGE UP (ref 3.8–5.2)
RBC # FLD: 13.8 % — SIGNIFICANT CHANGE UP (ref 10.3–14.5)
SODIUM SERPL-SCNC: 144 MMOL/L — SIGNIFICANT CHANGE UP (ref 135–145)
WBC # BLD: 3.43 K/UL — LOW (ref 3.8–10.5)
WBC # FLD AUTO: 3.43 K/UL — LOW (ref 3.8–10.5)

## 2023-02-19 PROCEDURE — 99232 SBSQ HOSP IP/OBS MODERATE 35: CPT

## 2023-02-19 RX ADMIN — ENOXAPARIN SODIUM 40 MILLIGRAM(S): 100 INJECTION SUBCUTANEOUS at 06:00

## 2023-02-19 RX ADMIN — Medication 60 MILLIGRAM(S): at 16:29

## 2023-02-19 RX ADMIN — SIMVASTATIN 5 MILLIGRAM(S): 20 TABLET, FILM COATED ORAL at 22:14

## 2023-02-19 RX ADMIN — DONEPEZIL HYDROCHLORIDE 10 MILLIGRAM(S): 10 TABLET, FILM COATED ORAL at 22:14

## 2023-02-19 RX ADMIN — ENOXAPARIN SODIUM 40 MILLIGRAM(S): 100 INJECTION SUBCUTANEOUS at 17:32

## 2023-02-19 RX ADMIN — MEMANTINE HYDROCHLORIDE 10 MILLIGRAM(S): 10 TABLET ORAL at 06:00

## 2023-02-19 RX ADMIN — Medication 60 MILLIGRAM(S): at 23:36

## 2023-02-19 RX ADMIN — Medication 60 MILLIGRAM(S): at 00:24

## 2023-02-19 RX ADMIN — MEMANTINE HYDROCHLORIDE 10 MILLIGRAM(S): 10 TABLET ORAL at 17:32

## 2023-02-19 RX ADMIN — REMDESIVIR 200 MILLIGRAM(S): 5 INJECTION INTRAVENOUS at 06:29

## 2023-02-19 RX ADMIN — Medication 60 MILLIGRAM(S): at 06:00

## 2023-02-20 LAB
BASOPHILS # BLD AUTO: 0.01 K/UL — SIGNIFICANT CHANGE UP (ref 0–0.2)
BASOPHILS NFR BLD AUTO: 0.2 % — SIGNIFICANT CHANGE UP (ref 0–2)
EOSINOPHIL # BLD AUTO: 0.07 K/UL — SIGNIFICANT CHANGE UP (ref 0–0.5)
EOSINOPHIL NFR BLD AUTO: 1.3 % — SIGNIFICANT CHANGE UP (ref 0–6)
HCT VFR BLD CALC: 41.8 % — SIGNIFICANT CHANGE UP (ref 34.5–45)
HGB BLD-MCNC: 13.6 G/DL — SIGNIFICANT CHANGE UP (ref 11.5–15.5)
IANC: 2.81 K/UL — SIGNIFICANT CHANGE UP (ref 1.8–7.4)
IMM GRANULOCYTES NFR BLD AUTO: 0.4 % — SIGNIFICANT CHANGE UP (ref 0–0.9)
LYMPHOCYTES # BLD AUTO: 1.72 K/UL — SIGNIFICANT CHANGE UP (ref 1–3.3)
LYMPHOCYTES # BLD AUTO: 33 % — SIGNIFICANT CHANGE UP (ref 13–44)
MCHC RBC-ENTMCNC: 30.1 PG — SIGNIFICANT CHANGE UP (ref 27–34)
MCHC RBC-ENTMCNC: 32.5 GM/DL — SIGNIFICANT CHANGE UP (ref 32–36)
MCV RBC AUTO: 92.5 FL — SIGNIFICANT CHANGE UP (ref 80–100)
MONOCYTES # BLD AUTO: 0.59 K/UL — SIGNIFICANT CHANGE UP (ref 0–0.9)
MONOCYTES NFR BLD AUTO: 11.3 % — SIGNIFICANT CHANGE UP (ref 2–14)
NEUTROPHILS # BLD AUTO: 2.81 K/UL — SIGNIFICANT CHANGE UP (ref 1.8–7.4)
NEUTROPHILS NFR BLD AUTO: 53.8 % — SIGNIFICANT CHANGE UP (ref 43–77)
NRBC # BLD: 0 /100 WBCS — SIGNIFICANT CHANGE UP (ref 0–0)
NRBC # FLD: 0 K/UL — SIGNIFICANT CHANGE UP (ref 0–0)
PLATELET # BLD AUTO: 177 K/UL — SIGNIFICANT CHANGE UP (ref 150–400)
RBC # BLD: 4.52 M/UL — SIGNIFICANT CHANGE UP (ref 3.8–5.2)
RBC # FLD: 13.5 % — SIGNIFICANT CHANGE UP (ref 10.3–14.5)
WBC # BLD: 5.22 K/UL — SIGNIFICANT CHANGE UP (ref 3.8–10.5)
WBC # FLD AUTO: 5.22 K/UL — SIGNIFICANT CHANGE UP (ref 3.8–10.5)

## 2023-02-20 PROCEDURE — 99232 SBSQ HOSP IP/OBS MODERATE 35: CPT

## 2023-02-20 RX ADMIN — SIMVASTATIN 5 MILLIGRAM(S): 20 TABLET, FILM COATED ORAL at 22:18

## 2023-02-20 RX ADMIN — Medication 60 MILLIGRAM(S): at 18:06

## 2023-02-20 RX ADMIN — Medication 60 MILLIGRAM(S): at 05:57

## 2023-02-20 RX ADMIN — ENOXAPARIN SODIUM 40 MILLIGRAM(S): 100 INJECTION SUBCUTANEOUS at 18:06

## 2023-02-20 RX ADMIN — MEMANTINE HYDROCHLORIDE 10 MILLIGRAM(S): 10 TABLET ORAL at 05:57

## 2023-02-20 RX ADMIN — Medication 60 MILLIGRAM(S): at 11:38

## 2023-02-20 RX ADMIN — MEMANTINE HYDROCHLORIDE 10 MILLIGRAM(S): 10 TABLET ORAL at 18:06

## 2023-02-20 RX ADMIN — ENOXAPARIN SODIUM 40 MILLIGRAM(S): 100 INJECTION SUBCUTANEOUS at 05:57

## 2023-02-20 RX ADMIN — DONEPEZIL HYDROCHLORIDE 10 MILLIGRAM(S): 10 TABLET, FILM COATED ORAL at 22:18

## 2023-02-20 RX ADMIN — REMDESIVIR 200 MILLIGRAM(S): 5 INJECTION INTRAVENOUS at 05:57

## 2023-02-20 NOTE — PROGRESS NOTE ADULT - PROBLEM SELECTOR PLAN 2
Prior team spoke with PMD Dr. Darden who was not able to obtain baseline labs at time of conversation  Improved s/p 500 cc NS bolus Prior team spoke with PMD Dr. Darden who was not able to obtain baseline labs at time of conversation  Improved s/p 500 cc NS bolus  CT chest suggestive of hydro vs cysts?  Obtain renal US to further evaluate

## 2023-02-20 NOTE — PROGRESS NOTE ADULT - PROBLEM SELECTOR PLAN 3
Resume home Namenda and Donepezil  Family states she eats regular diet with no choking Resume home Namenda and Donepezil  Family states she eats regular diet with no choking  CT chest with possible signs of aspiration  Speech and swallow eval requested

## 2023-02-21 LAB
ALBUMIN SERPL ELPH-MCNC: 3.5 G/DL — SIGNIFICANT CHANGE UP (ref 3.3–5)
ALP SERPL-CCNC: 97 U/L — SIGNIFICANT CHANGE UP (ref 40–120)
ALT FLD-CCNC: 42 U/L — HIGH (ref 4–33)
ANION GAP SERPL CALC-SCNC: 11 MMOL/L — SIGNIFICANT CHANGE UP (ref 7–14)
AST SERPL-CCNC: 34 U/L — HIGH (ref 4–32)
BASOPHILS # BLD AUTO: 0.02 K/UL — SIGNIFICANT CHANGE UP (ref 0–0.2)
BASOPHILS NFR BLD AUTO: 0.5 % — SIGNIFICANT CHANGE UP (ref 0–2)
BILIRUB SERPL-MCNC: 0.5 MG/DL — SIGNIFICANT CHANGE UP (ref 0.2–1.2)
BUN SERPL-MCNC: 17 MG/DL — SIGNIFICANT CHANGE UP (ref 7–23)
CALCIUM SERPL-MCNC: 9.2 MG/DL — SIGNIFICANT CHANGE UP (ref 8.4–10.5)
CHLORIDE SERPL-SCNC: 106 MMOL/L — SIGNIFICANT CHANGE UP (ref 98–107)
CO2 SERPL-SCNC: 23 MMOL/L — SIGNIFICANT CHANGE UP (ref 22–31)
CREAT SERPL-MCNC: 1 MG/DL — SIGNIFICANT CHANGE UP (ref 0.5–1.3)
EGFR: 59 ML/MIN/1.73M2 — LOW
EOSINOPHIL # BLD AUTO: 0.05 K/UL — SIGNIFICANT CHANGE UP (ref 0–0.5)
EOSINOPHIL NFR BLD AUTO: 1.2 % — SIGNIFICANT CHANGE UP (ref 0–6)
GLUCOSE SERPL-MCNC: 107 MG/DL — HIGH (ref 70–99)
HCT VFR BLD CALC: 39.5 % — SIGNIFICANT CHANGE UP (ref 34.5–45)
HGB BLD-MCNC: 13.2 G/DL — SIGNIFICANT CHANGE UP (ref 11.5–15.5)
IANC: 2.38 K/UL — SIGNIFICANT CHANGE UP (ref 1.8–7.4)
IMM GRANULOCYTES NFR BLD AUTO: 0.7 % — SIGNIFICANT CHANGE UP (ref 0–0.9)
LYMPHOCYTES # BLD AUTO: 1.3 K/UL — SIGNIFICANT CHANGE UP (ref 1–3.3)
LYMPHOCYTES # BLD AUTO: 30.7 % — SIGNIFICANT CHANGE UP (ref 13–44)
MAGNESIUM SERPL-MCNC: 2 MG/DL — SIGNIFICANT CHANGE UP (ref 1.6–2.6)
MCHC RBC-ENTMCNC: 31.1 PG — SIGNIFICANT CHANGE UP (ref 27–34)
MCHC RBC-ENTMCNC: 33.4 GM/DL — SIGNIFICANT CHANGE UP (ref 32–36)
MCV RBC AUTO: 93.2 FL — SIGNIFICANT CHANGE UP (ref 80–100)
MONOCYTES # BLD AUTO: 0.46 K/UL — SIGNIFICANT CHANGE UP (ref 0–0.9)
MONOCYTES NFR BLD AUTO: 10.8 % — SIGNIFICANT CHANGE UP (ref 2–14)
NEUTROPHILS # BLD AUTO: 2.38 K/UL — SIGNIFICANT CHANGE UP (ref 1.8–7.4)
NEUTROPHILS NFR BLD AUTO: 56.1 % — SIGNIFICANT CHANGE UP (ref 43–77)
NRBC # BLD: 0 /100 WBCS — SIGNIFICANT CHANGE UP (ref 0–0)
NRBC # FLD: 0 K/UL — SIGNIFICANT CHANGE UP (ref 0–0)
PHOSPHATE SERPL-MCNC: 3.3 MG/DL — SIGNIFICANT CHANGE UP (ref 2.5–4.5)
PLATELET # BLD AUTO: 171 K/UL — SIGNIFICANT CHANGE UP (ref 150–400)
POTASSIUM SERPL-MCNC: 3.6 MMOL/L — SIGNIFICANT CHANGE UP (ref 3.5–5.3)
POTASSIUM SERPL-SCNC: 3.6 MMOL/L — SIGNIFICANT CHANGE UP (ref 3.5–5.3)
PROT SERPL-MCNC: 6.4 G/DL — SIGNIFICANT CHANGE UP (ref 6–8.3)
RBC # BLD: 4.24 M/UL — SIGNIFICANT CHANGE UP (ref 3.8–5.2)
RBC # FLD: 13.6 % — SIGNIFICANT CHANGE UP (ref 10.3–14.5)
SODIUM SERPL-SCNC: 140 MMOL/L — SIGNIFICANT CHANGE UP (ref 135–145)
T3 SERPL-MCNC: 108 NG/DL — SIGNIFICANT CHANGE UP (ref 80–200)
TSH SERPL-MCNC: 1.7 UIU/ML — SIGNIFICANT CHANGE UP (ref 0.27–4.2)
WBC # BLD: 4.24 K/UL — SIGNIFICANT CHANGE UP (ref 3.8–10.5)
WBC # FLD AUTO: 4.24 K/UL — SIGNIFICANT CHANGE UP (ref 3.8–10.5)

## 2023-02-21 PROCEDURE — 99232 SBSQ HOSP IP/OBS MODERATE 35: CPT

## 2023-02-21 PROCEDURE — 76770 US EXAM ABDO BACK WALL COMP: CPT | Mod: 26

## 2023-02-21 RX ADMIN — Medication 60 MILLIGRAM(S): at 18:32

## 2023-02-21 RX ADMIN — Medication 60 MILLIGRAM(S): at 00:18

## 2023-02-21 RX ADMIN — ENOXAPARIN SODIUM 40 MILLIGRAM(S): 100 INJECTION SUBCUTANEOUS at 18:33

## 2023-02-21 RX ADMIN — ENOXAPARIN SODIUM 40 MILLIGRAM(S): 100 INJECTION SUBCUTANEOUS at 05:18

## 2023-02-21 RX ADMIN — SIMVASTATIN 5 MILLIGRAM(S): 20 TABLET, FILM COATED ORAL at 22:04

## 2023-02-21 RX ADMIN — Medication 60 MILLIGRAM(S): at 13:37

## 2023-02-21 RX ADMIN — MEMANTINE HYDROCHLORIDE 10 MILLIGRAM(S): 10 TABLET ORAL at 05:18

## 2023-02-21 RX ADMIN — Medication 60 MILLIGRAM(S): at 23:17

## 2023-02-21 RX ADMIN — MEMANTINE HYDROCHLORIDE 10 MILLIGRAM(S): 10 TABLET ORAL at 18:32

## 2023-02-21 RX ADMIN — Medication 60 MILLIGRAM(S): at 05:18

## 2023-02-21 RX ADMIN — DONEPEZIL HYDROCHLORIDE 10 MILLIGRAM(S): 10 TABLET, FILM COATED ORAL at 22:06

## 2023-02-21 NOTE — PROGRESS NOTE ADULT - PROBLEM SELECTOR PLAN 3
Resume home Namenda and Donepezil  Family states she eats regular diet with no choking  CT chest with possible signs of aspiration less likely given COVID infection  Speech and swallow eval requested

## 2023-02-21 NOTE — PHYSICAL THERAPY INITIAL EVALUATION ADULT - DIAGNOSIS, PT EVAL
deconditioning, decreased strength, decreased balance, postural instability limiting functional mobility

## 2023-02-21 NOTE — PHYSICAL THERAPY INITIAL EVALUATION ADULT - NSPTDISCHREC_GEN_A_CORE
Pt to be placed on skilled physical therapy trial for 1-2 more sessions to assess pt. ability to actively participate in skilled PT services. Please follow therapy services for ongoing assessment of functional mobility.

## 2023-02-21 NOTE — PROGRESS NOTE ADULT - PROBLEM SELECTOR PLAN 2
Prior team spoke with PMD Dr. Darden who was not able to obtain baseline labs at time of conversation  Improved s/p 500 cc NS bolus  CT chest suggestive of hydro vs cysts?  USX showed cyst no hydronephrosis

## 2023-02-21 NOTE — PHYSICAL THERAPY INITIAL EVALUATION ADULT - ADDITIONAL COMMENTS
pt is non verbal at baseline, unable to follow commands throughout PT session. Limited history obtained from medical documents. as per documents pt is fully dependent with ADLs and ambulates with assistance (unaware if pt requiring ambulatory device) Please consult with case management/social work for further clarification of history.     Pt. left comfortable in bed with all tubes/lines intact, call bell in reach and in NAD.

## 2023-02-22 LAB
ALBUMIN SERPL ELPH-MCNC: 3.4 G/DL — SIGNIFICANT CHANGE UP (ref 3.3–5)
ALP SERPL-CCNC: 110 U/L — SIGNIFICANT CHANGE UP (ref 40–120)
ALT FLD-CCNC: 46 U/L — HIGH (ref 4–33)
ANION GAP SERPL CALC-SCNC: 9 MMOL/L — SIGNIFICANT CHANGE UP (ref 7–14)
AST SERPL-CCNC: 37 U/L — HIGH (ref 4–32)
BASOPHILS # BLD AUTO: 0.01 K/UL — SIGNIFICANT CHANGE UP (ref 0–0.2)
BASOPHILS NFR BLD AUTO: 0.2 % — SIGNIFICANT CHANGE UP (ref 0–2)
BILIRUB SERPL-MCNC: 0.4 MG/DL — SIGNIFICANT CHANGE UP (ref 0.2–1.2)
BUN SERPL-MCNC: 12 MG/DL — SIGNIFICANT CHANGE UP (ref 7–23)
CALCIUM SERPL-MCNC: 9.4 MG/DL — SIGNIFICANT CHANGE UP (ref 8.4–10.5)
CHLORIDE SERPL-SCNC: 108 MMOL/L — HIGH (ref 98–107)
CO2 SERPL-SCNC: 24 MMOL/L — SIGNIFICANT CHANGE UP (ref 22–31)
CREAT SERPL-MCNC: 0.97 MG/DL — SIGNIFICANT CHANGE UP (ref 0.5–1.3)
CULTURE RESULTS: SIGNIFICANT CHANGE UP
CULTURE RESULTS: SIGNIFICANT CHANGE UP
EGFR: 61 ML/MIN/1.73M2 — SIGNIFICANT CHANGE UP
EOSINOPHIL # BLD AUTO: 0.08 K/UL — SIGNIFICANT CHANGE UP (ref 0–0.5)
EOSINOPHIL NFR BLD AUTO: 1.5 % — SIGNIFICANT CHANGE UP (ref 0–6)
GLUCOSE SERPL-MCNC: 105 MG/DL — HIGH (ref 70–99)
HCT VFR BLD CALC: 43.4 % — SIGNIFICANT CHANGE UP (ref 34.5–45)
HGB BLD-MCNC: 14 G/DL — SIGNIFICANT CHANGE UP (ref 11.5–15.5)
IANC: 3.18 K/UL — SIGNIFICANT CHANGE UP (ref 1.8–7.4)
IMM GRANULOCYTES NFR BLD AUTO: 0.6 % — SIGNIFICANT CHANGE UP (ref 0–0.9)
LYMPHOCYTES # BLD AUTO: 1.44 K/UL — SIGNIFICANT CHANGE UP (ref 1–3.3)
LYMPHOCYTES # BLD AUTO: 27.8 % — SIGNIFICANT CHANGE UP (ref 13–44)
MAGNESIUM SERPL-MCNC: 2.1 MG/DL — SIGNIFICANT CHANGE UP (ref 1.6–2.6)
MCHC RBC-ENTMCNC: 30.4 PG — SIGNIFICANT CHANGE UP (ref 27–34)
MCHC RBC-ENTMCNC: 32.3 GM/DL — SIGNIFICANT CHANGE UP (ref 32–36)
MCV RBC AUTO: 94.1 FL — SIGNIFICANT CHANGE UP (ref 80–100)
MONOCYTES # BLD AUTO: 0.44 K/UL — SIGNIFICANT CHANGE UP (ref 0–0.9)
MONOCYTES NFR BLD AUTO: 8.5 % — SIGNIFICANT CHANGE UP (ref 2–14)
NEUTROPHILS # BLD AUTO: 3.18 K/UL — SIGNIFICANT CHANGE UP (ref 1.8–7.4)
NEUTROPHILS NFR BLD AUTO: 61.4 % — SIGNIFICANT CHANGE UP (ref 43–77)
NRBC # BLD: 0 /100 WBCS — SIGNIFICANT CHANGE UP (ref 0–0)
NRBC # FLD: 0 K/UL — SIGNIFICANT CHANGE UP (ref 0–0)
PHOSPHATE SERPL-MCNC: 3.4 MG/DL — SIGNIFICANT CHANGE UP (ref 2.5–4.5)
PLATELET # BLD AUTO: 198 K/UL — SIGNIFICANT CHANGE UP (ref 150–400)
POTASSIUM SERPL-MCNC: 3.9 MMOL/L — SIGNIFICANT CHANGE UP (ref 3.5–5.3)
POTASSIUM SERPL-SCNC: 3.9 MMOL/L — SIGNIFICANT CHANGE UP (ref 3.5–5.3)
PROT SERPL-MCNC: 6.7 G/DL — SIGNIFICANT CHANGE UP (ref 6–8.3)
RBC # BLD: 4.61 M/UL — SIGNIFICANT CHANGE UP (ref 3.8–5.2)
RBC # FLD: 13.3 % — SIGNIFICANT CHANGE UP (ref 10.3–14.5)
SODIUM SERPL-SCNC: 141 MMOL/L — SIGNIFICANT CHANGE UP (ref 135–145)
SPECIMEN SOURCE: SIGNIFICANT CHANGE UP
SPECIMEN SOURCE: SIGNIFICANT CHANGE UP
WBC # BLD: 5.18 K/UL — SIGNIFICANT CHANGE UP (ref 3.8–10.5)
WBC # FLD AUTO: 5.18 K/UL — SIGNIFICANT CHANGE UP (ref 3.8–10.5)

## 2023-02-22 PROCEDURE — 99232 SBSQ HOSP IP/OBS MODERATE 35: CPT

## 2023-02-22 RX ADMIN — Medication 60 MILLIGRAM(S): at 12:35

## 2023-02-22 RX ADMIN — MEMANTINE HYDROCHLORIDE 10 MILLIGRAM(S): 10 TABLET ORAL at 05:09

## 2023-02-22 RX ADMIN — Medication 60 MILLIGRAM(S): at 17:44

## 2023-02-22 RX ADMIN — ENOXAPARIN SODIUM 40 MILLIGRAM(S): 100 INJECTION SUBCUTANEOUS at 05:09

## 2023-02-22 RX ADMIN — Medication 60 MILLIGRAM(S): at 05:09

## 2023-02-22 RX ADMIN — MEMANTINE HYDROCHLORIDE 10 MILLIGRAM(S): 10 TABLET ORAL at 17:42

## 2023-02-22 RX ADMIN — ENOXAPARIN SODIUM 40 MILLIGRAM(S): 100 INJECTION SUBCUTANEOUS at 17:42

## 2023-02-22 NOTE — SWALLOW BEDSIDE ASSESSMENT ADULT - SWALLOW EVAL: DIAGNOSIS
1- Mild oral stage for puree, soft and bite sized solids, regular solids, and thin liquids characterized by adequate oral containment, adequate mastication, adequate bolus manipulation with adequate anterior to posterior transfer with oral clearance noted. 2- Moderate oral stage for regular solids characterized by adequate oral containment, slowed/prolonged mastication with adequate anterior to posterior transport with trace oral residue noted and cleared with liquid wash. 3- Functional pharyngeal stage for puree, soft and bite sized solids, regular solids, and thin liquids characterized by initiation of pharyngeal swallow and hyolaryngeal excursion upon palpation. No overt signs of aspiration noted across PO trials. 1- Functional oral stage for puree, soft and bite sized solids, regular solids, and thin liquids characterized by adequate oral containment, adequate mastication, adequate bolus manipulation with adequate anterior to posterior transfer with oral clearance noted. 2- Moderate oral stage for regular solids characterized by adequate oral containment, slowed/prolonged mastication with adequate anterior to posterior transport with trace oral residue noted and cleared with liquid wash. 3- Functional pharyngeal stage for puree, soft and bite sized solids, regular solids, and thin liquids characterized by initiation of pharyngeal swallow and hyolaryngeal excursion upon palpation. No overt signs of aspiration noted across PO trials. 1- Functional oral stage for puree, soft and bite sized solids, and thin liquids characterized by adequate oral containment, adequate mastication, adequate bolus manipulation with adequate anterior to posterior transfer with oral clearance noted. 2- Moderate oral stage for regular solids characterized by adequate oral containment, slowed/prolonged mastication with adequate anterior to posterior transport with trace oral residue noted and cleared with liquid wash. 3- Functional pharyngeal stage for puree, soft and bite sized solids, regular solids, and thin liquids characterized by initiation of pharyngeal swallow and hyolaryngeal excursion upon palpation. No overt signs of aspiration noted across PO trials.

## 2023-02-22 NOTE — PROGRESS NOTE ADULT - PROBLEM SELECTOR PLAN 3
c/w home Namenda and Donepezil  Family states she eats regular diet with no choking  CT chest with possible signs of aspiration less likely given COVID infection, bedside S/S with no gross aspiration, will not pursue MBS at this time

## 2023-02-22 NOTE — PROGRESS NOTE ADULT - PROBLEM SELECTOR PLAN 4
Resume home Cardizem

## 2023-02-22 NOTE — PROGRESS NOTE ADULT - PROBLEM SELECTOR PROBLEM 2
Elevated serum creatinine

## 2023-02-22 NOTE — PROGRESS NOTE ADULT - PROBLEM SELECTOR PROBLEM 3
Routing refill request to provider for review/approval because:  Medication is reported/historical    Antonieta Patel RN on 10/11/2021 at 12:52 PM          
Alzheimer's dementia

## 2023-02-22 NOTE — SWALLOW BEDSIDE ASSESSMENT ADULT - COMMENTS
Progress Note- Hospitalist 2/21: "77F dementia (nonverbal at baseline), requires full assist with ADLs, HTN, HLD, presents with several days of lethargy and cough in setting of acute COVID infection."    CR Chest 2/17: "IMPRESSION: Left lower lobe dependent groundglass opacity may be secondary to atelectasis. Pneumonia or aspiration not excluded. Clinical correlation necessary. 5 mm hypodense nodule in the right thyroid lobe. Elective thyroid ultrasound can be performed for further evaluation."    Patient seen awake/alert and pleasantly confused during clinical swallow evaluation this AM. Patient is did not follow simple directives or respond to SLP prompts.

## 2023-02-22 NOTE — SWALLOW BEDSIDE ASSESSMENT ADULT - ASR SWALLOW RECOMMEND DIAG
Consider cinesophragram if there is concern for aspiration pneumonia given CXR 2/17 at MD's discretion/VFSS/MBS

## 2023-02-22 NOTE — PROGRESS NOTE ADULT - PROBLEM SELECTOR PLAN 1
Pt fully vaccinated and boosted, not hypoxic, not tachycardic  - No indication for steroids at this time  - Paxlovid cannot be crushed per pharmacy  - Given high risk features, will start Remdesivir  - D-Dimer wnl  - If clinically worsens can trend inflammatory markers (Ferritin, LDH, CRP)
Pt fully vaccinated and boosted, not hypoxic, not tachycardic  - No indication for steroids at this time  - Paxlovid cannot be crushed per pharmacy  - Given high risk features, will start Remdesivir  - Add on D-Dimer to AM bloodwork to determine need for prolonged DVT ppx   - If clinically worsens can trend inflammatory markers (Ferritin, LDH, CRP)
Pt fully vaccinated and boosted, not hypoxic, not tachycardic  - No indication for steroids at this time  - Paxlovid cannot be crushed per pharmacy  - Given high risk features, will start Remdesivir - s/p 3 day course   - D-Dimer wnl  - If clinically worsens can trend inflammatory markers (Ferritin, LDH, CRP)
Pt fully vaccinated and boosted, not hypoxic, not tachycardic  - No indication for steroids at this time  - Paxlovid cannot be crushed per pharmacy  -  Remdesivir - s/p 3 day course   - D-Dimer wnl  - If clinically worsens can trend inflammatory markers (Ferritin, LDH, CRP)
Pt fully vaccinated and boosted, not hypoxic, not tachycardic  - No indication for steroids at this time  - Paxlovid cannot be crushed per pharmacy  -  Remdesivir - s/p 3 day course   - D-Dimer wnl  - If clinically worsens can trend inflammatory markers (Ferritin, LDH, CRP)

## 2023-02-22 NOTE — PROGRESS NOTE ADULT - SUBJECTIVE AND OBJECTIVE BOX
[facility] Medicine  Torrey Mcleod MD  Pager [phone]      Patient is a 77y old  Female who presents with a chief complaint of COVID and lethargy (21 Feb 2023 18:22)      SUBJECTIVE / OVERNIGHT EVENTS: Unable to obtain due to non-verbal status    MEDICATIONS  (STANDING):  diltiazem    Tablet 60 milliGRAM(s) Oral four times a day  donepezil 10 milliGRAM(s) Oral at bedtime  enoxaparin Injectable 40 milliGRAM(s) SubCutaneous every 12 hours  memantine 10 milliGRAM(s) Oral two times a day  simvastatin 5 milliGRAM(s) Oral at bedtime    MEDICATIONS  (PRN):  acetaminophen     Tablet .. 650 milliGRAM(s) Oral every 6 hours PRN Temp greater or equal to 38C (100.4F), Mild Pain (1 - 3)      CAPILLARY BLOOD GLUCOSE        I&O's Summary    21 Feb 2023 07:01  -  22 Feb 2023 07:00  --------------------------------------------------------  IN: 360 mL / OUT: 425 mL / NET: -65 mL    22 Feb 2023 07:01  -  22 Feb 2023 14:39  --------------------------------------------------------  IN: 240 mL / OUT: 400 mL / NET: -160 mL        PHYSICAL EXAM:  Vital Signs Last 24 Hrs  T(C): 37.1 (22 Feb 2023 12:32), Max: 37.1 (21 Feb 2023 18:29)  T(F): 98.8 (22 Feb 2023 12:32), Max: 98.8 (21 Feb 2023 18:29)  HR: 69 (22 Feb 2023 12:32) (62 - 69)  BP: 146/91 (22 Feb 2023 12:32) (111/77 - 152/74)  BP(mean): 98 (21 Feb 2023 23:15) (98 - 98)  RR: 18 (22 Feb 2023 12:32) (18 - 18)  SpO2: 100% (22 Feb 2023 12:32) (97% - 100%)    Parameters below as of 22 Feb 2023 12:32  Patient On (Oxygen Delivery Method): room air      CONSTITUTIONAL: NAD  EYES: conjunctiva and sclera clear  ENMT: mmm  NECK: Supple,  RESPIRATORY: Normal respiratory effort; lungs are clear to auscultation bilaterally  CARDIOVASCULAR: Regular rate and rhythm, + S1 and S2  ABDOMEN: Nontender to palpation, normoactive bowel sounds, no rebound/guarding  MUSCULOSKELETAL:  no clubbing or cyanosis of digits;   PSYCH: Alert, not oriented  NEUROLOGY: no gross deficits   SKIN: No rashes;     LABS:                        14.0   5.18  )-----------( 198      ( 22 Feb 2023 05:58 )             43.4     02-22    141  |  108<H>  |  12  ----------------------------<  105<H>  3.9   |  24  |  0.97    Ca    9.4      22 Feb 2023 05:58  Phos  3.4     02-22  Mg     2.10     02-22    TPro  6.7  /  Alb  3.4  /  TBili  0.4  /  DBili  x   /  AST  37<H>  /  ALT  46<H>  /  AlkPhos  110  02-22            
Patient is a 77y old  Female who presents with a chief complaint of COVID and lethargy (2023 10:26)      SUBJECTIVE / OVERNIGHT EVENTS: No acute events overnight. Per RN, no issues this AM.     MEDICATIONS  (STANDING):  diltiazem    Tablet 60 milliGRAM(s) Oral four times a day  donepezil 10 milliGRAM(s) Oral at bedtime  enoxaparin Injectable 40 milliGRAM(s) SubCutaneous every 12 hours  memantine 10 milliGRAM(s) Oral two times a day  remdesivir  IVPB   IV Intermittent   simvastatin 5 milliGRAM(s) Oral at bedtime    MEDICATIONS  (PRN):  acetaminophen     Tablet .. 650 milliGRAM(s) Oral every 6 hours PRN Temp greater or equal to 38C (100.4F), Mild Pain (1 - 3)      T(C): 36.9 (23 @ 11:50), Max: 37.3 (23 @ 23:35)  HR: 61 (23 @ 11:50) (61 - 84)  BP: 162/84 (23 @ 11:50) (154/80 - 171/89)  RR: 18 (23 @ 11:50) (15 - 18)  SpO2: 100% (23 @ 11:50) (99% - 100%)  CAPILLARY BLOOD GLUCOSE        I&O's Summary      PHYSICAL EXAM:  GENERAL: no apparent distress, on room air  EYES: EOMI, PERRLA, conjunctiva and sclera clear b/l  CHEST/LUNG: Clear to auscultation bilaterally; No wheezing or crackles  HEART: s1/s2, RRR, no murmurs appreciated  ABDOMEN: Soft, Nontender, Nondistended; Bowel sounds present  EXTREMITIES:  2+ Peripheral Pulses, No clubbing, cyanosis, or edema  MSK: no joint effusions  Back: no spinal tenderness  NEUROLOGY: awake, alert, moving all extremities   PSYCH: calm, cooperative  SKIN: No rashes or lesions    LABS:                        13.6   4.56  )-----------( 157      ( 2023 08:06 )             41.4     02-18    141  |  105  |  15  ----------------------------<  94  3.6   |  23  |  0.93    Ca    9.5      2023 08:06  Phos  3.2     02-18  Mg     2.10     -18    TPro  7.3  /  Alb  4.1  /  TBili  0.5  /  DBili  x   /  AST  26  /  ALT  22  /  AlkPhos  105  02-18    PT/INR - ( 2023 22:02 )   PT: 13.1 sec;   INR: 1.13 ratio         PTT - ( 2023 22:02 )  PTT:29.3 sec      Urinalysis Basic - ( 2023 04:22 )    Color: Yellow / Appearance: Slightly Turbid / S.030 / pH: x  Gluc: x / Ketone: Trace  / Bili: Negative / Urobili: 3 mg/dL   Blood: x / Protein: 30 mg/dL / Nitrite: Negative   Leuk Esterase: Negative / RBC: 0-5 /HPF / WBC 0-5 /HPF   Sq Epi: x / Non Sq Epi: Occasional / Bacteria: Occasional        RADIOLOGY & ADDITIONAL TESTS:
[facility]  Torrey Mcleod MD  Pager [phone]      Patient is a 77y old  Female who presents with a chief complaint of COVID and lethargy (20 Feb 2023 12:28)      SUBJECTIVE / OVERNIGHT EVENTS: Unable to obtain due to non-verbal status    MEDICATIONS  (STANDING):  diltiazem    Tablet 60 milliGRAM(s) Oral four times a day  donepezil 10 milliGRAM(s) Oral at bedtime  enoxaparin Injectable 40 milliGRAM(s) SubCutaneous every 12 hours  memantine 10 milliGRAM(s) Oral two times a day  simvastatin 5 milliGRAM(s) Oral at bedtime    MEDICATIONS  (PRN):  acetaminophen     Tablet .. 650 milliGRAM(s) Oral every 6 hours PRN Temp greater or equal to 38C (100.4F), Mild Pain (1 - 3)      CAPILLARY BLOOD GLUCOSE        I&O's Summary    20 Feb 2023 07:01  -  21 Feb 2023 07:00  --------------------------------------------------------  IN: 0 mL / OUT: 350 mL / NET: -350 mL    21 Feb 2023 07:01  -  21 Feb 2023 18:22  --------------------------------------------------------  IN: 120 mL / OUT: 225 mL / NET: -105 mL        PHYSICAL EXAM:  Vital Signs Last 24 Hrs  T(C): 37.4 (21 Feb 2023 13:35), Max: 37.4 (21 Feb 2023 13:35)  T(F): 99.4 (21 Feb 2023 13:35), Max: 99.4 (21 Feb 2023 13:35)  HR: 60 (21 Feb 2023 13:35) (60 - 85)  BP: 158/66 (21 Feb 2023 13:35) (158/66 - 168/79)  BP(mean): --  RR: 16 (21 Feb 2023 13:35) (16 - 18)  SpO2: 100% (21 Feb 2023 13:35) (98% - 100%)    Parameters below as of 21 Feb 2023 13:35  Patient On (Oxygen Delivery Method): room air      CONSTITUTIONAL: NAD  EYES: conjunctiva and sclera clear  ENMT: mmm  NECK: Supple,  RESPIRATORY: Normal respiratory effort; lungs are clear to auscultation bilaterally  CARDIOVASCULAR: Regular rate and rhythm, + S1 and S2  ABDOMEN: Nontender to palpation, normoactive bowel sounds, no rebound/guarding  MUSCULOSKELETAL:  no clubbing or cyanosis of digits;   PSYCH:  Alert not oriented  SKIN: No rashes;     LABS:                        13.2   4.24  )-----------( 171      ( 21 Feb 2023 07:05 )             39.5     02-21    140  |  106  |  17  ----------------------------<  107<H>  3.6   |  23  |  1.00    Ca    9.2      21 Feb 2023 07:05  Phos  3.3     02-21  Mg     2.00     02-21    TPro  6.4  /  Alb  3.5  /  TBili  0.5  /  DBili  x   /  AST  34<H>  /  ALT  42<H>  /  AlkPhos  97  02-21                    
[facility]  Dr. Chelly Hare  Pager [phone]    Patient is a 77y old  Female who presents with a chief complaint of COVID and lethargy (18 Feb 2023 16:25)    SUBJECTIVE / OVERNIGHT EVENTS: No acute events overnight. Unable to obtain any ROS from patient. Awake but not very interactive     MEDICATIONS  (STANDING):  diltiazem    Tablet 60 milliGRAM(s) Oral four times a day  donepezil 10 milliGRAM(s) Oral at bedtime  enoxaparin Injectable 40 milliGRAM(s) SubCutaneous every 12 hours  memantine 10 milliGRAM(s) Oral two times a day  remdesivir  IVPB 100 milliGRAM(s) IV Intermittent every 24 hours  remdesivir  IVPB   IV Intermittent   simvastatin 5 milliGRAM(s) Oral at bedtime    MEDICATIONS  (PRN):  acetaminophen     Tablet .. 650 milliGRAM(s) Oral every 6 hours PRN Temp greater or equal to 38C (100.4F), Mild Pain (1 - 3)    Vital Signs Last 24 Hrs  T(C): 36.9 (20 Feb 2023 11:34), Max: 37.3 (19 Feb 2023 22:10)  T(F): 98.5 (20 Feb 2023 11:34), Max: 99.1 (19 Feb 2023 22:10)  HR: 63 (20 Feb 2023 11:34) (63 - 68)  BP: 165/79 (20 Feb 2023 11:34) (151/63 - 169/88)  BP(mean): --  RR: 18 (20 Feb 2023 11:34) (17 - 18)  SpO2: 99% (20 Feb 2023 11:34) (95% - 100%)    Parameters below as of 20 Feb 2023 11:34  Patient On (Oxygen Delivery Method): room air    PHYSICAL EXAM:  GENERAL: no apparent distress, on room air  EYES: EOMI, PERRLA, conjunctiva and sclera clear b/l  CHEST/LUNG: Clear to auscultation bilaterally; No wheezing or crackles  HEART: s1/s2, RRR, no murmurs appreciated  ABDOMEN: Soft, Nontender, Nondistended; Bowel sounds present  EXTREMITIES:  2+ Peripheral Pulses, No clubbing, cyanosis, or edema  MSK: no joint effusions  Back: no spinal tenderness  NEUROLOGY: awake, alert, nonverbal  PSYCH: calm, cooperative  SKIN: No rashes or lesions    LABS:                                   13.6   5.22  )-----------( 177      ( 20 Feb 2023 06:50 )             41.8   02-19    144  |  108<H>  |  17  ----------------------------<  90  3.9   |  25  |  1.07    Ca    9.5      19 Feb 2023 07:00  Phos  3.6     02-19  Mg     2.10     02-19    TPro  6.8  /  Alb  3.7  /  TBili  0.5  /  DBili  x   /  AST  28  /  ALT  24  /  AlkPhos  104  02-19      RADIOLOGY & ADDITIONAL TESTS:
Patient is a 77y old  Female who presents with a chief complaint of COVID and lethargy (18 Feb 2023 16:25)      SUBJECTIVE / OVERNIGHT EVENTS: No acute events overnight. Per RN, no issues.     MEDICATIONS  (STANDING):  diltiazem    Tablet 60 milliGRAM(s) Oral four times a day  donepezil 10 milliGRAM(s) Oral at bedtime  enoxaparin Injectable 40 milliGRAM(s) SubCutaneous every 12 hours  memantine 10 milliGRAM(s) Oral two times a day  remdesivir  IVPB 100 milliGRAM(s) IV Intermittent every 24 hours  remdesivir  IVPB   IV Intermittent   simvastatin 5 milliGRAM(s) Oral at bedtime    MEDICATIONS  (PRN):  acetaminophen     Tablet .. 650 milliGRAM(s) Oral every 6 hours PRN Temp greater or equal to 38C (100.4F), Mild Pain (1 - 3)      T(C): 36.8 (02-19-23 @ 12:19), Max: 37.2 (02-18-23 @ 18:15)  HR: 57 (02-19-23 @ 12:19) (57 - 72)  BP: 159/81 (02-19-23 @ 12:19) (151/98 - 159/82)  RR: 18 (02-19-23 @ 12:19) (18 - 18)  SpO2: 100% (02-19-23 @ 12:19) (94% - 100%)  CAPILLARY BLOOD GLUCOSE        I&O's Summary      PHYSICAL EXAM:  GENERAL: no apparent distress, on room air  EYES: EOMI, PERRLA, conjunctiva and sclera clear b/l  CHEST/LUNG: Clear to auscultation bilaterally; No wheezing or crackles  HEART: s1/s2, RRR, no murmurs appreciated  ABDOMEN: Soft, Nontender, Nondistended; Bowel sounds present  EXTREMITIES:  2+ Peripheral Pulses, No clubbing, cyanosis, or edema  MSK: no joint effusions  Back: no spinal tenderness  NEUROLOGY: awake, alert, nonverbal  PSYCH: calm, cooperative  SKIN: No rashes or lesions    LABS:                        14.1   3.43  )-----------( 171      ( 19 Feb 2023 07:00 )             43.7     02-19    144  |  108<H>  |  17  ----------------------------<  90  3.9   |  25  |  1.07    Ca    9.5      19 Feb 2023 07:00  Phos  3.6     02-19  Mg     2.10     02-19    TPro  6.8  /  Alb  3.7  /  TBili  0.5  /  DBili  x   /  AST  28  /  ALT  24  /  AlkPhos  104  02-19              RADIOLOGY & ADDITIONAL TESTS:

## 2023-02-22 NOTE — PROGRESS NOTE ADULT - PROBLEM SELECTOR PLAN 6
DVT ppx with Lovenox 40mg BID for now (CrCl >15), monitor Cr daily and switch to HSQ of CrCl worsens   Care discussed with family at bedside
DVT ppx with Lovenox 40mg BID for now (CrCl >15), monitor Cr daily and switch to HSQ of CrCl worsens     DC planning pending final PT recs   Attempted to leave message for spouse, unable to leave a message 2/21
DVT ppx with Lovenox 40mg BID for now (CrCl >15), monitor Cr daily and switch to HSQ of CrCl worsens   Care discussed with family at bedside
DVT ppx with Lovenox 40mg BID for now (CrCl >15), monitor Cr daily and switch to HSQ of CrCl worsens     DC ready, pt is at baseline, not appropriate for PT program   Attempted to leave message for spouse, unable to leave a message 2/21 and 2/22  Dc pending family acceptance
DVT ppx with Lovenox 40mg BID for now (CrCl >15), monitor Cr daily and switch to HSQ of CrCl worsens     DC planning. PT consult ordered

## 2023-02-23 VITALS
SYSTOLIC BLOOD PRESSURE: 149 MMHG | TEMPERATURE: 99 F | HEART RATE: 66 BPM | RESPIRATION RATE: 18 BRPM | DIASTOLIC BLOOD PRESSURE: 72 MMHG | OXYGEN SATURATION: 99 %

## 2023-02-23 LAB
ALBUMIN SERPL ELPH-MCNC: 3.7 G/DL — SIGNIFICANT CHANGE UP (ref 3.3–5)
ALP SERPL-CCNC: 114 U/L — SIGNIFICANT CHANGE UP (ref 40–120)
ALT FLD-CCNC: 64 U/L — HIGH (ref 4–33)
ANION GAP SERPL CALC-SCNC: 10 MMOL/L — SIGNIFICANT CHANGE UP (ref 7–14)
AST SERPL-CCNC: 56 U/L — HIGH (ref 4–32)
BILIRUB SERPL-MCNC: 0.4 MG/DL — SIGNIFICANT CHANGE UP (ref 0.2–1.2)
BUN SERPL-MCNC: 16 MG/DL — SIGNIFICANT CHANGE UP (ref 7–23)
CALCIUM SERPL-MCNC: 9.4 MG/DL — SIGNIFICANT CHANGE UP (ref 8.4–10.5)
CHLORIDE SERPL-SCNC: 110 MMOL/L — HIGH (ref 98–107)
CO2 SERPL-SCNC: 23 MMOL/L — SIGNIFICANT CHANGE UP (ref 22–31)
CREAT SERPL-MCNC: 1.05 MG/DL — SIGNIFICANT CHANGE UP (ref 0.5–1.3)
EGFR: 55 ML/MIN/1.73M2 — LOW
GLUCOSE SERPL-MCNC: 107 MG/DL — HIGH (ref 70–99)
HCT VFR BLD CALC: 41.4 % — SIGNIFICANT CHANGE UP (ref 34.5–45)
HGB BLD-MCNC: 13.7 G/DL — SIGNIFICANT CHANGE UP (ref 11.5–15.5)
MAGNESIUM SERPL-MCNC: 2.1 MG/DL — SIGNIFICANT CHANGE UP (ref 1.6–2.6)
MCHC RBC-ENTMCNC: 30 PG — SIGNIFICANT CHANGE UP (ref 27–34)
MCHC RBC-ENTMCNC: 33.1 GM/DL — SIGNIFICANT CHANGE UP (ref 32–36)
MCV RBC AUTO: 90.8 FL — SIGNIFICANT CHANGE UP (ref 80–100)
NRBC # BLD: 0 /100 WBCS — SIGNIFICANT CHANGE UP (ref 0–0)
NRBC # FLD: 0 K/UL — SIGNIFICANT CHANGE UP (ref 0–0)
PHOSPHATE SERPL-MCNC: 3.3 MG/DL — SIGNIFICANT CHANGE UP (ref 2.5–4.5)
PLATELET # BLD AUTO: 232 K/UL — SIGNIFICANT CHANGE UP (ref 150–400)
POTASSIUM SERPL-MCNC: 4.2 MMOL/L — SIGNIFICANT CHANGE UP (ref 3.5–5.3)
POTASSIUM SERPL-SCNC: 4.2 MMOL/L — SIGNIFICANT CHANGE UP (ref 3.5–5.3)
PROT SERPL-MCNC: 6.8 G/DL — SIGNIFICANT CHANGE UP (ref 6–8.3)
RBC # BLD: 4.56 M/UL — SIGNIFICANT CHANGE UP (ref 3.8–5.2)
RBC # FLD: 13.4 % — SIGNIFICANT CHANGE UP (ref 10.3–14.5)
SODIUM SERPL-SCNC: 143 MMOL/L — SIGNIFICANT CHANGE UP (ref 135–145)
WBC # BLD: 6.72 K/UL — SIGNIFICANT CHANGE UP (ref 3.8–10.5)
WBC # FLD AUTO: 6.72 K/UL — SIGNIFICANT CHANGE UP (ref 3.8–10.5)

## 2023-02-23 PROCEDURE — 99239 HOSP IP/OBS DSCHRG MGMT >30: CPT

## 2023-02-23 RX ORDER — SIMVASTATIN 20 MG/1
1 TABLET, FILM COATED ORAL
Qty: 0 | Refills: 0 | DISCHARGE

## 2023-02-23 RX ORDER — MEMANTINE HYDROCHLORIDE 10 MG/1
1 TABLET ORAL
Qty: 0 | Refills: 0 | DISCHARGE

## 2023-02-23 RX ORDER — DONEPEZIL HYDROCHLORIDE 10 MG/1
1 TABLET, FILM COATED ORAL
Qty: 0 | Refills: 0 | DISCHARGE

## 2023-02-23 RX ORDER — DILTIAZEM HCL 120 MG
1 CAPSULE, EXT RELEASE 24 HR ORAL
Qty: 0 | Refills: 0 | DISCHARGE

## 2023-02-23 RX ADMIN — ENOXAPARIN SODIUM 40 MILLIGRAM(S): 100 INJECTION SUBCUTANEOUS at 05:04

## 2023-02-23 RX ADMIN — MEMANTINE HYDROCHLORIDE 10 MILLIGRAM(S): 10 TABLET ORAL at 05:03

## 2023-02-23 RX ADMIN — Medication 60 MILLIGRAM(S): at 00:14

## 2023-02-23 RX ADMIN — Medication 60 MILLIGRAM(S): at 17:16

## 2023-02-23 RX ADMIN — SIMVASTATIN 5 MILLIGRAM(S): 20 TABLET, FILM COATED ORAL at 00:18

## 2023-02-23 RX ADMIN — ENOXAPARIN SODIUM 40 MILLIGRAM(S): 100 INJECTION SUBCUTANEOUS at 17:16

## 2023-02-23 RX ADMIN — MEMANTINE HYDROCHLORIDE 10 MILLIGRAM(S): 10 TABLET ORAL at 17:16

## 2023-02-23 RX ADMIN — DONEPEZIL HYDROCHLORIDE 10 MILLIGRAM(S): 10 TABLET, FILM COATED ORAL at 00:18

## 2023-02-23 RX ADMIN — Medication 60 MILLIGRAM(S): at 12:17

## 2023-02-23 RX ADMIN — Medication 60 MILLIGRAM(S): at 05:03

## 2023-02-23 NOTE — DISCHARGE NOTE PROVIDER - NSDCMRMEDTOKEN_GEN_ALL_CORE_FT
Cardizem 60 mg oral tablet: 1 tab(s) orally 4 times a day  donepezil 10 mg oral tablet: 1 tab(s) orally once a day (at bedtime)  memantine 10 mg oral tablet: 1 tab(s) orally 2 times a day  Zocor 5 mg oral tablet: 1 tab(s) orally once a day (at bedtime)

## 2023-02-23 NOTE — PHARMACOTHERAPY INTERVENTION NOTE - COMMENTS
Medication list in Outpatient Medication Review (OMR) verified with pharmacy.   Family notes patient uses eyedrops, only eyedrop on file per [facility] is Combigan (1 drop affected eye(s) BID) however it was last dispensed in Oct/2022 x 75 day supply.

## 2023-02-23 NOTE — DISCHARGE NOTE NURSING/CASE MANAGEMENT/SOCIAL WORK - PATIENT PORTAL LINK FT
You can access the FollowMyHealth Patient Portal offered by [facility] by registering at the following website: [email or web address]. By joining [employer]’s FollowMyHealth portal, you will also be able to view your health information using other applications (apps) compatible with our system.

## 2023-02-23 NOTE — DISCHARGE NOTE PROVIDER - HOSPITAL COURSE
77F dementia (nonverbal at baseline), requires full assist with ADLs, HTN, HLD, presents with several days of lethargy and cough in setting of acute COVID infection     2019 novel coronavirus disease (COVID-19).   Pt fully vaccinated and boosted, not hypoxic, not tachycardic  - No indication for steroids at this time  - Paxlovid cannot be crushed per pharmacy  -  Remdesivir - s/p 3 day course   - D-Dimer wnl  - If clinically worsens can trend inflammatory markers (Ferritin, LDH, CRP).    Elevated serum creatinine.   Prior team spoke with PMD Dr. Darden who was not able to obtain baseline labs at time of conversation  Improved s/p 500 cc NS bolus  CT chest suggestive of hydro vs cysts?  USX showed cyst no hydronephrosis.    Alzheimer's dementia.   c/w home Namenda and Donepezil  Family states she eats regular diet with no choking  CT chest with possible signs of aspiration less likely given COVID infection, bedside S/S with no gross aspiration, will not pursue MBS at this time.    HTN (hypertension).   Resume home Cardizem.    HLD (hyperlipidemia).   Resume home Zocor.    Patient is medically stable for discharge on 2/23/2023 per attending Dr. Mcleod.       77F dementia (nonverbal at baseline), requires full assist with ADLs, HTN, HLD, presents with several days of lethargy and cough in setting of acute COVID infection.    Pt admitted for symptomatic COVID     2019 novel coronavirus disease (COVID-19).   -  Remdesivir - s/p 3 day course   - D-Dimer wnl       SAMIA  Prior team spoke with PMD Dr. Darden who was not able to obtain baseline labs at time of conversation  Improved s/p 500 cc NS bolus  CT chest suggestive of hydro vs cysts?  USX showed cyst no hydronephrosis.    Alzheimer's dementia.   c/w home Namenda and Donepezil  Family states she eats regular diet with no choking  CT chest with possible signs of aspiration less likely given COVID infection, bedside S/S with no gross aspiration, will not pursue MBS at this time.    HTN (hypertension).   Resume home Cardizem.    HLD (hyperlipidemia).   Resume home Zocor.    Patient is medically stable for discharge on 2/23/2023 per attending Dr. Mcleod.

## 2023-02-23 NOTE — DISCHARGE NOTE NURSING/CASE MANAGEMENT/SOCIAL WORK - NSDCPEFALRISK_GEN_ALL_CORE
For information on Fall & Injury Prevention, visit: [email or web address] OR  [email or web address] OR  https://www.cdc.gov/steadi/patient.html

## 2023-02-23 NOTE — DISCHARGE NOTE PROVIDER - NSDCCPCAREPLAN_GEN_ALL_CORE_FT
PRINCIPAL DISCHARGE DIAGNOSIS  Diagnosis: COVID-19  Assessment and Plan of Treatment: You were admitted in the hospital for COVID-19. You received remdesivir during your hospitalization which is a treatment for COVID-19. Your oxygen saturation is stable on room air. Follow up with your primary care physician outpatient.      SECONDARY DISCHARGE DIAGNOSES  Diagnosis: Alzheimer's dementia  Assessment and Plan of Treatment: Continue taking Namenda and Donepezil.    Diagnosis: HTN (hypertension)  Assessment and Plan of Treatment: Low sodium and fat diet, continue anti-hypertensive medications, and follow up with primary care physician.    Diagnosis: HLD (hyperlipidemia)  Assessment and Plan of Treatment: Low fat diet, exercise daily and continue current medications. Follow up with primary care physician and cardiologist for management.    Diagnosis: SAMIA (acute kidney injury)  Assessment and Plan of Treatment: Your kidney function was abnormal on admission. You received IV fluids during your hospitalization. Your kidney function was improved upon discharge. Follow up with your primary care physician outpatient.

## 2023-02-23 NOTE — CHART NOTE - NSCHARTNOTEFT_GEN_A_CORE
Nutrition Note:    Pt scheduled to be d/c today.  Since d/c is imminent, complete nutrition assessment at this time is not warranted.  Will remain available if plans changed.     Mandy Burt RDN #[id number]

## 2024-05-16 NOTE — ED PROVIDER NOTE - HIV OFFER
[FreeTextEntry1] : has been having urinary frequency.  Last HbA1C was 7.4 in February. no hematuria or dysuria., Nocturia X 2-3.  It is less with weight loss. no post void fullness. 
Unable to answer due to medical condition/unresponsive/etc...

## 2025-01-24 NOTE — PROGRESS NOTE ADULT - PROBLEM/PLAN-2
The patient had a recent respiratory infection likely viral and had significant shortness of breath for about 2 weeks but eventually did start feeling better.  Her recent CT showed a decrease in the size of her right upper lobe nodule I reviewed the scan with her.  I will see her back in approximately 4 months with complete PFTs.  Will get a follow-up scan in approximately 1 year.  
DISPLAY PLAN FREE TEXT